# Patient Record
Sex: FEMALE | Race: WHITE
[De-identification: names, ages, dates, MRNs, and addresses within clinical notes are randomized per-mention and may not be internally consistent; named-entity substitution may affect disease eponyms.]

---

## 2019-10-12 ENCOUNTER — TRANSCRIPTION ENCOUNTER (OUTPATIENT)
Age: 49
End: 2019-10-12

## 2019-12-03 ENCOUNTER — TRANSCRIPTION ENCOUNTER (OUTPATIENT)
Age: 49
End: 2019-12-03

## 2020-04-22 ENCOUNTER — ASOB RESULT (OUTPATIENT)
Age: 50
End: 2020-04-22

## 2020-04-22 ENCOUNTER — APPOINTMENT (OUTPATIENT)
Dept: ANTEPARTUM | Facility: CLINIC | Age: 50
End: 2020-04-22
Payer: MEDICAID

## 2020-04-22 PROCEDURE — 76830 TRANSVAGINAL US NON-OB: CPT

## 2020-04-22 PROCEDURE — 76856 US EXAM PELVIC COMPLETE: CPT | Mod: 59

## 2020-10-16 ENCOUNTER — APPOINTMENT (OUTPATIENT)
Dept: DERMATOLOGY | Facility: CLINIC | Age: 50
End: 2020-10-16
Payer: MEDICAID

## 2020-10-16 PROCEDURE — 99203 OFFICE O/P NEW LOW 30 MIN: CPT

## 2020-10-16 NOTE — HISTORY OF PRESENT ILLNESS
[de-identified] : Pt. c/o outbreaks on face;\par has used multiple new products;  wedding in 6 weeks; \par On spironolactone for acne x many years; 100/d

## 2020-10-16 NOTE — ASSESSMENT
[FreeTextEntry1] : Acne;  had been doing well for years on spirono; \par now with mild flareup\par \par Therapeutic options and their risks and benefits; along with multiple diagnostic possibilities were discussed at length;\par \par possibly exacerbated by OTC retinol wipes;  d/c;  \par clindets/5% BPO gel BID; cont spirono; \par use both through wedding; \par f/u if does not improve

## 2020-10-16 NOTE — PHYSICAL EXAM
[FreeTextEntry3] : Skin examination performed of the face, neck, chest, hands, lower legs;\par The patient is well, alert and oriented, pleasant and cooperative.\par Eyelids, conjunctivae, oral mucosa, digits and nails all normal.  \par No cervical adenopathy.\par \par \par few scattered acne lesions on face;  no cysts;

## 2021-02-02 ENCOUNTER — TRANSCRIPTION ENCOUNTER (OUTPATIENT)
Age: 51
End: 2021-02-02

## 2021-03-24 ENCOUNTER — TRANSCRIPTION ENCOUNTER (OUTPATIENT)
Age: 51
End: 2021-03-24

## 2021-04-07 ENCOUNTER — TRANSCRIPTION ENCOUNTER (OUTPATIENT)
Age: 51
End: 2021-04-07

## 2021-04-12 ENCOUNTER — TRANSCRIPTION ENCOUNTER (OUTPATIENT)
Age: 51
End: 2021-04-12

## 2021-05-03 DIAGNOSIS — Z86.59 PERSONAL HISTORY OF OTHER MENTAL AND BEHAVIORAL DISORDERS: ICD-10-CM

## 2021-05-03 DIAGNOSIS — Z86.39 PERSONAL HISTORY OF OTHER ENDOCRINE, NUTRITIONAL AND METABOLIC DISEASE: ICD-10-CM

## 2021-05-13 ENCOUNTER — TRANSCRIPTION ENCOUNTER (OUTPATIENT)
Age: 51
End: 2021-05-13

## 2021-07-26 ENCOUNTER — TRANSCRIPTION ENCOUNTER (OUTPATIENT)
Age: 51
End: 2021-07-26

## 2021-10-18 ENCOUNTER — APPOINTMENT (OUTPATIENT)
Dept: DERMATOLOGY | Facility: CLINIC | Age: 51
End: 2021-10-18
Payer: MEDICAID

## 2021-10-18 DIAGNOSIS — L28.0 LICHEN SIMPLEX CHRONICUS: ICD-10-CM

## 2021-10-18 PROCEDURE — 99214 OFFICE O/P EST MOD 30 MIN: CPT

## 2021-10-18 NOTE — HISTORY OF PRESENT ILLNESS
[de-identified] : Pt. c/o outbreaks on face;\par On spironolactone for acne x many years; 100/d \par \par also: skin check;  c/o lesion on face; itchy; \par

## 2021-10-18 NOTE — ASSESSMENT
[FreeTextEntry1] : Acne;  had been doing well for years on spirono; \par some exacerbation due to testosterone therapy from PMD\par \par Therapeutic options and their risks and benefits; along with multiple diagnostic possibilities were discussed at length;\par \par continue spirono 100/d, restart 5% BPO gel\par \par LSC L upper forehead;  keep covered, avoid rubbing;  may need ILK or topical steroid if persists\par The patient has a history of significant sun exposure.  Continue annual exams. \par

## 2021-12-08 ENCOUNTER — APPOINTMENT (OUTPATIENT)
Dept: ORTHOPEDIC SURGERY | Facility: CLINIC | Age: 51
End: 2021-12-08

## 2021-12-20 ENCOUNTER — TRANSCRIPTION ENCOUNTER (OUTPATIENT)
Age: 51
End: 2021-12-20

## 2021-12-20 ENCOUNTER — APPOINTMENT (OUTPATIENT)
Dept: NEUROSURGERY | Facility: CLINIC | Age: 51
End: 2021-12-20
Payer: MEDICAID

## 2021-12-20 VITALS
BODY MASS INDEX: 26.66 KG/M2 | OXYGEN SATURATION: 100 % | HEART RATE: 72 BPM | SYSTOLIC BLOOD PRESSURE: 103 MMHG | DIASTOLIC BLOOD PRESSURE: 70 MMHG | WEIGHT: 160 LBS | HEIGHT: 65 IN | TEMPERATURE: 97.8 F

## 2021-12-20 PROCEDURE — 99202 OFFICE O/P NEW SF 15 MIN: CPT

## 2021-12-20 NOTE — CONSULT LETTER
[Dear  ___] : Dear  [unfilled], [Courtesy Letter:] : I had the pleasure of seeing your patient, [unfilled], in my office today. [Sincerely,] : Sincerely, [FreeTextEntry2] : Abel Morrell MD \par [address]\par [place] [id number] [FreeTextEntry1] : Nita Dumont is a 51-year-old  who presents with cervical symptoms.  Patient reports symptoms started sometime in the summer.  She is not sure of a particular event however she reports during the summertime her dog had bit her in the medial aspect of her left palm and instantly produced numbness and tingling.  She now reports intermittent numbness and tingling to her entire left arm and hand and fingers.  She reports an intermittent neck ache which radiates into her left shoulder.  She denies any sharp shooting pain.  She denies any arm weakness or dropping of objects from her hands.  She denies any dexterity issues.  She denies any bowel or bladder dysfunction or difficulties with walking.  She denies any chest pain, palpitations, or shortness of breath.\par \par Patient indicates she was seen in urgent care facility July 26, 2021 around the same time was having the paresthesias.  She had a full cardiac work-up done.  Negative findings as per patient.\par \par Patient is alert and oriented.  No distress noted.  Sensation to pinprick and temperature to bilateral upper extremities equal and normal.  Strength to bilateral arms 5/5.  Reflexes to bilateral upper extremities present and equal.  Negative Mat's.  Negative Tinel's.  Negative Phalen's.  Negative Froment's.  Negative Wartenberg's.  Patient is walking without difficulties.\par \par I provided patient with a prescription for an x-ray of the cervical spine.  I provided the patient with a referral for physical therapy.  We will follow-up in approximately 6 weeks to evaluate for progression.  Patient is aware to call with any further questions or concerns or with any new or worsening symptoms.  If symptoms persist or worsen we will consider MRI of the cervical spine as well as an EMG study of the left upper extremity. [FreeTextEntry3] : Sparkle Bingham, MSN, FNP-BC\par Nurse Practitioner \par Neurosurgery \par [address] [place] [address], 2nd floor \[place] [address]\par Office: [phone]\par Fax: [phone]\par

## 2021-12-20 NOTE — CONSULT LETTER
[Dear  ___] : Dear  [unfilled], [Courtesy Letter:] : I had the pleasure of seeing your patient, [unfilled], in my office today. [Sincerely,] : Sincerely, [FreeTextEntry2] : Abel Morrell MD \par [address]\par [place] [id number] [FreeTextEntry1] : Nita Dumont is a 51-year-old  who presents with cervical symptoms.  Patient reports symptoms started sometime in the summer.  She is not sure of a particular event however she reports during the summertime her dog had bit her in the medial aspect of her left palm and instantly produced numbness and tingling.  She now reports intermittent numbness and tingling to her entire left arm and hand and fingers.  She reports an intermittent neck ache which radiates into her left shoulder.  She denies any sharp shooting pain.  She denies any arm weakness or dropping of objects from her hands.  She denies any dexterity issues.  She denies any bowel or bladder dysfunction or difficulties with walking.  She denies any chest pain, palpitations, or shortness of breath.\par \par Patient indicates she was seen in urgent care facility July 26, 2021 around the same time was having the paresthesias.  She had a full cardiac work-up done.  Negative findings as per patient.\par \par Patient is alert and oriented.  No distress noted.  Sensation to pinprick and temperature to bilateral upper extremities equal and normal.  Strength to bilateral arms 5/5.  Reflexes to bilateral upper extremities present and equal.  Negative Mat's.  Negative Tinel's.  Negative Phalen's.  Negative Froment's.  Negative Wartenberg's.  Patient is walking without difficulties.\par \par I provided patient with a prescription for an x-ray of the cervical spine.  I provided the patient with a referral for physical therapy.  We will follow-up in approximately 6 weeks to evaluate for progression.  Patient is aware to call with any further questions or concerns or with any new or worsening symptoms.  If symptoms persist or worsen we will consider MRI of the cervical spine as well as an EMG study of the left upper extremity. [FreeTextEntry3] : Sparkle Bingham, MSN, FNP-BC\par Nurse Practitioner \par Neurosurgery \par [address] [place] [address], 2nd floor \[place] [address]\par Office: [phone]\par Fax: [phone]\par

## 2022-01-31 ENCOUNTER — APPOINTMENT (OUTPATIENT)
Dept: NEUROSURGERY | Facility: CLINIC | Age: 52
End: 2022-01-31
Payer: MEDICAID

## 2022-01-31 VITALS
WEIGHT: 169 LBS | DIASTOLIC BLOOD PRESSURE: 66 MMHG | HEART RATE: 77 BPM | SYSTOLIC BLOOD PRESSURE: 97 MMHG | HEIGHT: 65 IN | OXYGEN SATURATION: 98 % | TEMPERATURE: 97.6 F | BODY MASS INDEX: 28.16 KG/M2

## 2022-01-31 PROCEDURE — 99214 OFFICE O/P EST MOD 30 MIN: CPT

## 2022-01-31 NOTE — CONSULT LETTER
[Dear  ___] : Dear  [unfilled], [Courtesy Letter:] : I had the pleasure of seeing your patient, [unfilled], in my office today. [Sincerely,] : Sincerely, [FreeTextEntry2] : Abel Morrell MD \par [address]\par [place] [id number]  [FreeTextEntry1] : Zheng Dumont is a 52-year-old female who returns today for cervical symptoms.  Patient has undergone 6 weeks of physical therapy without any relief.  She continues to experience stiffness in her cervical and shoulder region.  She continues to experience tingling to her left posterior arm and into her hand involving all fingers.  She denies any upper extremity weakness, dropping of objects from her hands, difficulties with walking or tripping over her feet.  She denies any dexterity issues.  Patient is right-handed.  She denies bowel or bladder dysfunction.  Patient indicates shaking her right hand provides her with some relief.  Once again she started physical therapy on December 21, 2021  without any relief noted.  Patient underwent x-ray of the cervical spine performed on 12/22/2021 at [facility] indicating "no dynamic instability, degenerative changes, straightening of the normal cervical lordosis."  Patient is alert and oriented. No distress noted. Strength to bilateral arms 5/5. Reflexes to bilateral upper extremities present and equal. Negative Mat's.  Negative clonus.  Positive right Tinel's.  Not appreciated on the left side.Negative Phalen's. Negative Froment's. Negative Wartenberg's.  I provided patient with a referral for massage pain.  I provided the patient with a prescription for an MRI of the cervical spine and EMG study of the left upper extremity.  Patient will phone office once imaging is available at which time we will discuss the next steps in the treatment process.  Patient aware to call with any further questions or concerns or with any new or worsening symptoms.  [FreeTextEntry3] : Sparkle Bingham, MSN, FNP-BC\par Department of Neurosurgery \par [facility]\par [address] [place] [address], 2nd floor\[place] [address]\par Office: [phone]\par Fax: [phone]\par

## 2022-02-10 ENCOUNTER — TRANSCRIPTION ENCOUNTER (OUTPATIENT)
Age: 52
End: 2022-02-10

## 2022-03-17 ENCOUNTER — APPOINTMENT (OUTPATIENT)
Dept: NEUROSURGERY | Facility: CLINIC | Age: 52
End: 2022-03-17
Payer: MEDICAID

## 2022-03-17 VITALS
DIASTOLIC BLOOD PRESSURE: 69 MMHG | HEART RATE: 65 BPM | SYSTOLIC BLOOD PRESSURE: 115 MMHG | WEIGHT: 169 LBS | OXYGEN SATURATION: 98 % | BODY MASS INDEX: 28.16 KG/M2 | HEIGHT: 65 IN | TEMPERATURE: 97.3 F

## 2022-03-17 PROCEDURE — 99214 OFFICE O/P EST MOD 30 MIN: CPT

## 2022-03-17 NOTE — CONSULT LETTER
[Dear  ___] : Dear  [unfilled], [Courtesy Letter:] : I had the pleasure of seeing your patient, [unfilled], in my office today. [Sincerely,] : Sincerely, [FreeTextEntry2] : Abel Morrell MD \par [address]\[place] [address] \par  [FreeTextEntry1] : Mrs. Dumont is a very pleasant 51-year-old female patient who was seen in our office today in follow-up.  The patient was seen in regards to neck heaviness and left-sided upper extremity symptoms.\par \par The patient endorses a several month history of numbness  radiating down the left upper extremity as well as a sensation of heaviness in the neck overall.  The patient has attempted physical therapy without significant benefit.  At physical therapy, the patient participated in both active and passive exercises and the patient attempted physical therapy 3 times a week for over 3 months at this time, the patient states that her symptoms have not changed significantly.  The patient continues to experience intermittent numbness in the left arm radiating from the neck into the first 2 digits of her hand primarily.  Occasionally, the patient also experiences numbness on the volar aspect of the last 2 digits of the left  hand.  The patient denies any symptoms occurring on the right.  The patient denies any persistent weakness in the upper extremity.  However, the patient did experience an episode of weakness in the  left hand after falling asleep on it.  The patient states that her hand function  has since returned to normal and has not happened again.  The patient does endorse worsening of her left-sided upper extremity symptoms with extension of the neck as well as rotation to the right.\par \par On examination, the patient is alert, oriented, and compliant with the exam.  The patient demonstrates 5/5 strength in the upper extremities bilaterally.  Percussion of the cubital and carpal tunnel does not elicit any neurologic symptoms.  Rotation of the head to the right and extension of the neck can reproduce hand and arm numbness.\par \par The patient is accompanied with an MRI scan of the cervical spine dated February 28, 2022.  These images demonstrate loss of cervical lordosis, though these images were taken supine.  The patient additionally has a very small disc bulge at C6/7 slightly eccentric to the left.  There is no ongoing nerve root compression or spinal cord compression.  At C5/6, there is evidence of degenerative disc disease with evidence of Modic changes.  Again, there is no evidence of spinal cord or nerve root compression.\par \par Taken together, the patient has a slightly atypical presentation for a cervical radiculopathy.  There are no obvious surgical lesions in the cervical spine that would correspond to the patient's intermittent symptoms though there is a consistent relationship with movements of the neck.  The patient's distribution of numbness is more consistent with a C6 and/or C8 radiculopathy.  However, the possibility of an ulnar nerve irritation or brachial plexus issue remains on the differential. The possibility of a thoracic outlet syndrome also remains on the differential.  At this time, the patient is awaiting nerve conduction studies and EMG studies to help diagnose this condition.  Ultimately, the patient states that the numbness in her hand and arm is irritating but not something she would consider surgical intervention for.  I have reassured the patient that there is no urgent surgical lesion in her cervical spine and that she may continue activities as tolerated in this regard.  The patient has been recommended follow-up once her electrophysiologic studies are complete so that we can review them with her. [FreeTextEntry3] : Kenneth Jensen MD, PhD, FRCPSC \par Attending Neurosurgeon \par [facility] \par [address], 2nd floor \[place] [address] \par Office: [phone] \par Fax: [phone]\par \par

## 2022-03-21 ENCOUNTER — APPOINTMENT (OUTPATIENT)
Dept: INTERNAL MEDICINE | Facility: CLINIC | Age: 52
End: 2022-03-21
Payer: MEDICAID

## 2022-03-21 VITALS
TEMPERATURE: 98.3 F | WEIGHT: 169 LBS | DIASTOLIC BLOOD PRESSURE: 60 MMHG | OXYGEN SATURATION: 97 % | SYSTOLIC BLOOD PRESSURE: 110 MMHG | HEIGHT: 65 IN | RESPIRATION RATE: 16 BRPM | BODY MASS INDEX: 28.16 KG/M2 | HEART RATE: 61 BPM

## 2022-03-21 DIAGNOSIS — F32.A DEPRESSION, UNSPECIFIED: ICD-10-CM

## 2022-03-21 DIAGNOSIS — M47.812 SPONDYLOSIS W/OUT MYELOPATHY OR RADICULOPATHY, CERVICAL REGION: ICD-10-CM

## 2022-03-21 PROCEDURE — 99386 PREV VISIT NEW AGE 40-64: CPT

## 2022-03-21 NOTE — HEALTH RISK ASSESSMENT
[Never] : Never [No] : In the past 12 months have you used drugs other than those required for medical reasons? No [No falls in past year] : Patient reported no falls in the past year [0] : 2) Feeling down, depressed, or hopeless: Not at all (0) [[id number]] : 0

## 2022-03-21 NOTE — ASSESSMENT
[FreeTextEntry1] : Ms. Dumont presents for initial annual physical examination.\par \par #1. Patient will discontinue estrogen and progesterone medications. She will also discontinue metformin. She will continue on Wellbutrin.\par \par #2. Prescription for CBC, CMP with hemoglobin A1c, iron level, lipid profile, TSH level and urinalysis at the patient.\par \par #3. She will establish medical contact with the new gynecologist for mammogram, Pap smear and bone density testing.\par \par #4. Patient will be referred to Dr. Bernstein for screening colonoscopy.

## 2022-03-21 NOTE — HISTORY OF PRESENT ILLNESS
[FreeTextEntry1] : Initial annual physical examination [de-identified] : Ms. Dumont presents for initial annual physical examination. She is a 51-year-old female with a history of hyperlipidemia, depression and cervical and lumbar disc disease. Patient is moving from [facility] and wishes to reestablish care in this office. She denies any chest pain, shortness of breath or palpitations. Ms. Dumont has no hematuria or dysuria. She was started on metformin by her previous primary care doctor 4 obesity. The patient does take multiple vitamins and supplements. She has no nocturnal symptoms of cough or shortness of breath. She has been on Wellbutrin for many years with positive results.

## 2022-03-23 ENCOUNTER — TRANSCRIPTION ENCOUNTER (OUTPATIENT)
Age: 52
End: 2022-03-23

## 2022-03-23 ENCOUNTER — NON-APPOINTMENT (OUTPATIENT)
Age: 52
End: 2022-03-23

## 2022-04-07 RX ORDER — BUPROPION HYDROCHLORIDE 75 MG/1
TABLET, FILM COATED ORAL
Refills: 0 | Status: DISCONTINUED | COMMUNITY
End: 2022-04-07

## 2022-04-23 ENCOUNTER — TRANSCRIPTION ENCOUNTER (OUTPATIENT)
Age: 52
End: 2022-04-23

## 2022-05-02 ENCOUNTER — NON-APPOINTMENT (OUTPATIENT)
Age: 52
End: 2022-05-02

## 2022-05-02 ENCOUNTER — APPOINTMENT (OUTPATIENT)
Dept: ORTHOPEDIC SURGERY | Facility: CLINIC | Age: 52
End: 2022-05-02
Payer: MEDICAID

## 2022-05-02 VITALS — WEIGHT: 169 LBS | BODY MASS INDEX: 28.16 KG/M2 | HEIGHT: 65 IN

## 2022-05-02 PROCEDURE — 99214 OFFICE O/P EST MOD 30 MIN: CPT

## 2022-05-02 PROCEDURE — 72100 X-RAY EXAM L-S SPINE 2/3 VWS: CPT

## 2022-05-13 ENCOUNTER — APPOINTMENT (OUTPATIENT)
Dept: ULTRASOUND IMAGING | Facility: CLINIC | Age: 52
End: 2022-05-13
Payer: MEDICAID

## 2022-05-13 ENCOUNTER — OUTPATIENT (OUTPATIENT)
Dept: OUTPATIENT SERVICES | Facility: HOSPITAL | Age: 52
LOS: 1 days | End: 2022-05-13
Payer: MEDICAID

## 2022-05-13 DIAGNOSIS — E78.00 PURE HYPERCHOLESTEROLEMIA, UNSPECIFIED: ICD-10-CM

## 2022-05-13 PROCEDURE — 93880 EXTRACRANIAL BILAT STUDY: CPT | Mod: 26

## 2022-05-13 PROCEDURE — 93880 EXTRACRANIAL BILAT STUDY: CPT

## 2022-05-15 DIAGNOSIS — I77.89 OTHER SPECIFIED DISORDERS OF ARTERIES AND ARTERIOLES: ICD-10-CM

## 2022-05-18 ENCOUNTER — NON-APPOINTMENT (OUTPATIENT)
Age: 52
End: 2022-05-18

## 2022-05-26 ENCOUNTER — APPOINTMENT (OUTPATIENT)
Dept: NEUROLOGY | Facility: CLINIC | Age: 52
End: 2022-05-26
Payer: MEDICAID

## 2022-05-26 PROCEDURE — 95909 NRV CNDJ TST 5-6 STUDIES: CPT

## 2022-05-26 PROCEDURE — 95886 MUSC TEST DONE W/N TEST COMP: CPT

## 2022-08-18 LAB
ALBUMIN SERPL ELPH-MCNC: 4 G/DL
ALP BLD-CCNC: 49 U/L
ALT SERPL-CCNC: 10 U/L
AST SERPL-CCNC: 19 U/L
BASOPHILS # BLD AUTO: 0.07 K/UL
BASOPHILS NFR BLD AUTO: 0.9 %
BILIRUB DIRECT SERPL-MCNC: 0.1 MG/DL
BILIRUB INDIRECT SERPL-MCNC: 0.2 MG/DL
BILIRUB SERPL-MCNC: 0.3 MG/DL
CHOLEST SERPL-MCNC: 247 MG/DL
EOSINOPHIL # BLD AUTO: 0.09 K/UL
EOSINOPHIL NFR BLD AUTO: 1.2 %
FERRITIN SERPL-MCNC: 77 NG/ML
FOLATE SERPL-MCNC: 9 NG/ML
HCT VFR BLD CALC: 38.2 %
HDLC SERPL-MCNC: 63 MG/DL
HGB BLD-MCNC: 13 G/DL
IMM GRANULOCYTES NFR BLD AUTO: 0.3 %
IRON SATN MFR SERPL: 37 %
IRON SERPL-MCNC: 117 UG/DL
LDLC SERPL CALC-MCNC: 156 MG/DL
LYMPHOCYTES # BLD AUTO: 2.47 K/UL
LYMPHOCYTES NFR BLD AUTO: 31.8 %
MAN DIFF?: NORMAL
MCHC RBC-ENTMCNC: 31.8 PG
MCHC RBC-ENTMCNC: 34 GM/DL
MCV RBC AUTO: 93.4 FL
MONOCYTES # BLD AUTO: 0.49 K/UL
MONOCYTES NFR BLD AUTO: 6.3 %
NEUTROPHILS # BLD AUTO: 4.62 K/UL
NEUTROPHILS NFR BLD AUTO: 59.5 %
NONHDLC SERPL-MCNC: 184 MG/DL
PLATELET # BLD AUTO: 281 K/UL
PROT SERPL-MCNC: 6.6 G/DL
RBC # BLD: 4.09 M/UL
RBC # BLD: 4.09 M/UL
RBC # FLD: 12.3 %
RETICS # AUTO: 1.5 %
RETICS AGGREG/RBC NFR: 61.4 K/UL
TIBC SERPL-MCNC: 314 UG/DL
TRANSFERRIN SERPL-MCNC: 241 MG/DL
TRIGL SERPL-MCNC: 137 MG/DL
UIBC SERPL-MCNC: 197 UG/DL
VIT B12 SERPL-MCNC: 790 PG/ML
WBC # FLD AUTO: 7.76 K/UL

## 2022-09-14 ENCOUNTER — RX RENEWAL (OUTPATIENT)
Age: 52
End: 2022-09-14

## 2022-11-21 ENCOUNTER — APPOINTMENT (OUTPATIENT)
Dept: DERMATOLOGY | Facility: CLINIC | Age: 52
End: 2022-11-21

## 2022-11-21 DIAGNOSIS — L70.0 ACNE VULGARIS: ICD-10-CM

## 2022-11-21 PROCEDURE — 99214 OFFICE O/P EST MOD 30 MIN: CPT

## 2022-11-21 NOTE — HISTORY OF PRESENT ILLNESS
[de-identified] : Pt. c/o outbreaks on face;\par On spironolactone for acne x many years; 100/d \par \par also: skin check;  c/o itchy spot on L hand after wasp sting\par

## 2022-11-21 NOTE — ASSESSMENT
[FreeTextEntry1] : Acne;  had been doing well for years on spirono; \par some exacerbation due to testosterone therapy from PMD\par \par Therapeutic options and their risks and benefits; along with multiple diagnostic possibilities were discussed at length;\par \par continue spirono 100/d, \par \par The patient has a history of significant sun exposure.  Continue annual exams. \par

## 2022-11-21 NOTE — PHYSICAL EXAM
[FreeTextEntry3] : Skin examination performed of the face, neck, chest, hands, lower legs;\par The patient is well, alert and oriented, pleasant and cooperative.\par Eyelids, conjunctivae, oral mucosa, digits and nails all normal.  \par No cervical adenopathy.\par \par few scattered acne lesions on face;  \par \par + lentigines and solar damage are present in sun exposed areas; \par Multiple benign nevi were noted. \par \par Firm, pink, subcutaneous buttonholing papule Left palm; \par \par No lesions suspicious for malignancy.

## 2023-02-03 ENCOUNTER — RX RENEWAL (OUTPATIENT)
Age: 53
End: 2023-02-03

## 2023-02-27 ENCOUNTER — RX RENEWAL (OUTPATIENT)
Age: 53
End: 2023-02-27

## 2023-03-22 ENCOUNTER — APPOINTMENT (OUTPATIENT)
Dept: INTERNAL MEDICINE | Facility: CLINIC | Age: 53
End: 2023-03-22
Payer: MEDICAID

## 2023-03-22 ENCOUNTER — LABORATORY RESULT (OUTPATIENT)
Age: 53
End: 2023-03-22

## 2023-03-22 VITALS
WEIGHT: 163 LBS | HEART RATE: 74 BPM | DIASTOLIC BLOOD PRESSURE: 70 MMHG | OXYGEN SATURATION: 98 % | SYSTOLIC BLOOD PRESSURE: 104 MMHG | HEIGHT: 65 IN | BODY MASS INDEX: 27.16 KG/M2 | RESPIRATION RATE: 16 BRPM | TEMPERATURE: 97.8 F

## 2023-03-22 DIAGNOSIS — Z00.00 ENCOUNTER FOR GENERAL ADULT MEDICAL EXAMINATION W/OUT ABNORMAL FINDINGS: ICD-10-CM

## 2023-03-22 PROCEDURE — 99396 PREV VISIT EST AGE 40-64: CPT

## 2023-03-22 RX ORDER — IBUPROFEN 800 MG/1
800 TABLET, FILM COATED ORAL 3 TIMES DAILY
Qty: 90 | Refills: 1 | Status: DISCONTINUED | COMMUNITY
Start: 2022-05-02 | End: 2023-03-22

## 2023-03-22 RX ORDER — METHOCARBAMOL 750 MG/1
750 TABLET, FILM COATED ORAL 3 TIMES DAILY
Qty: 90 | Refills: 1 | Status: DISCONTINUED | COMMUNITY
Start: 2022-05-02 | End: 2023-03-22

## 2023-03-22 RX ORDER — CLINDAMYCIN PHOSPHATE 10 MG/ML
1 SOLUTION TOPICAL
Qty: 60 | Refills: 5 | Status: DISCONTINUED | COMMUNITY
Start: 2020-10-16 | End: 2023-03-22

## 2023-03-22 RX ORDER — NORGESTIMATE AND ETHINYL ESTRADIOL 0.25-0.035
KIT ORAL
Refills: 0 | Status: DISCONTINUED | COMMUNITY
End: 2023-03-22

## 2023-03-22 RX ORDER — BENZOYL PEROXIDE 5 G/100G
5 GEL TOPICAL
Qty: 1 | Refills: 3 | Status: DISCONTINUED | COMMUNITY
Start: 2020-10-16 | End: 2023-03-22

## 2023-03-22 NOTE — HEALTH RISK ASSESSMENT
[Yes] : Yes [Never (0 pts)] : Never (0 points) [No] : In the past 12 months have you used drugs other than those required for medical reasons? No [0] : 1) Little interest or pleasure doing things: Not at all (0) [1] : 2) Feeling down, depressed, or hopeless for several days (1) [Never] : Never [Very Good] : ~his/her~  mood as very good [MammogramDate] : 01/22 [PapSmearDate] : 01/22

## 2023-03-22 NOTE — HISTORY OF PRESENT ILLNESS
[FreeTextEntry1] : Mrs. Dumont presents for annual physical examination.\par She is complaining of constipation, weight gain and coldness of extremities.  She does have a history of Raynaud's phenomenon. [de-identified] : Mrs. Dumont presents for annual physical examination.\par She is concerned about the fact that she has been unable to lose weight in the past year.  She states that she has been dieting on a regular basis and has eliminated carbohydrates.  She has not really been exercising.  Ms. Dumont states that she has had significant difficulty in losing weight.  She is currently 163 pounds with a BMI of 27.12.  She did weigh 169 pounds in May 2022.\par Ms. Dumont has a history of Raynaud's phenomenon.  She often gets significant coldness of her toes and fingers in the cold weather.  They will occasionally become pale.\par Patient is also complaining of constipation.  She states that since she was on a cruise in 2019 she has had persistent constipation.  She does use laxatives which give positive results, however, she does not want to depend on laxatives for bowel movements.  She has tried change in diet with no significant improvement.  Patient has not seen a gastroenterologist.

## 2023-03-22 NOTE — PLAN
[FreeTextEntry1] : Ms. Dumont presents for annual physical examination.\par \par 1.  She has been complaining of inability to lose weight.  I have recommended that she see nutritionist.  She will check with her insurance company to see if nutritional services are provided.  If not she will contact this office for referral.\par \par 2.  Ms. Dumont has been complaining of constipation.  It does respond to laxatives however she does not want to depend on laxatives for bowel movements.  She is also never had a colonoscopy.  Patient has been referred to Dr. Power for evaluation of her constipation as well as screening colonoscopy.\par \par 3.  Patient will continue to follow-up with her gynecologist regarding the mammograms, Pap smears.\par \par 4.  Patient continues to have symptoms of Raynaud's phenomenon.  She has been given a prescription for CBC, CMP, hemoglobin A1c, thyroid function, Iron levels, lipid profile and urinalysis.  Collagen vascular screening has also been included.  She will have an IVIS, rheumatoid factor, sed rate and CRP level.  Patient may require rheumatology evaluation.\par \par 5.  Follow-up in 6 months

## 2023-03-23 LAB
ALBUMIN SERPL ELPH-MCNC: 4.2 G/DL
ALP BLD-CCNC: 42 U/L
ALT SERPL-CCNC: 15 U/L
ANA PAT FLD IF-IMP: ABNORMAL
ANA SER IF-ACNC: ABNORMAL
ANION GAP SERPL CALC-SCNC: 14 MMOL/L
APPEARANCE: CLEAR
AST SERPL-CCNC: 18 U/L
BACTERIA: NEGATIVE
BASOPHILS # BLD AUTO: 0.04 K/UL
BASOPHILS NFR BLD AUTO: 0.6 %
BILIRUB SERPL-MCNC: 0.5 MG/DL
BILIRUBIN URINE: NEGATIVE
BLOOD URINE: NEGATIVE
BUN SERPL-MCNC: 11 MG/DL
CALCIUM SERPL-MCNC: 9.7 MG/DL
CHLORIDE SERPL-SCNC: 97 MMOL/L
CHOLEST SERPL-MCNC: 273 MG/DL
CO2 SERPL-SCNC: 23 MMOL/L
COLOR: NORMAL
CREAT SERPL-MCNC: 0.83 MG/DL
CRP SERPL-MCNC: 5 MG/L
EGFR: 84 ML/MIN/1.73M2
EOSINOPHIL # BLD AUTO: 0.03 K/UL
EOSINOPHIL NFR BLD AUTO: 0.4 %
ERYTHROCYTE [SEDIMENTATION RATE] IN BLOOD BY WESTERGREN METHOD: 29 MM/HR
ESTIMATED AVERAGE GLUCOSE: 100 MG/DL
FT4I SERPL CALC-MCNC: 9.2 INDEX
GLUCOSE QUALITATIVE U: NEGATIVE
GLUCOSE SERPL-MCNC: 77 MG/DL
HBA1C MFR BLD HPLC: 5.1 %
HCT VFR BLD CALC: 41.6 %
HDLC SERPL-MCNC: 66 MG/DL
HGB BLD-MCNC: 13.7 G/DL
HYALINE CASTS: 0 /LPF
IMM GRANULOCYTES NFR BLD AUTO: 0.1 %
IRON SATN MFR SERPL: 66 %
IRON SERPL-MCNC: 205 UG/DL
KETONES URINE: ABNORMAL
LDLC SERPL CALC-MCNC: 186 MG/DL
LEUKOCYTE ESTERASE URINE: NEGATIVE
LYMPHOCYTES # BLD AUTO: 1.72 K/UL
LYMPHOCYTES NFR BLD AUTO: 23.8 %
MAN DIFF?: NORMAL
MCHC RBC-ENTMCNC: 32.2 PG
MCHC RBC-ENTMCNC: 32.9 GM/DL
MCV RBC AUTO: 97.7 FL
MICROSCOPIC-UA: NORMAL
MONOCYTES # BLD AUTO: 0.53 K/UL
MONOCYTES NFR BLD AUTO: 7.3 %
NEUTROPHILS # BLD AUTO: 4.91 K/UL
NEUTROPHILS NFR BLD AUTO: 67.8 %
NITRITE URINE: NEGATIVE
NONHDLC SERPL-MCNC: 206 MG/DL
PH URINE: 6.5
PLATELET # BLD AUTO: 308 K/UL
POTASSIUM SERPL-SCNC: 4.6 MMOL/L
PROT SERPL-MCNC: 6.9 G/DL
PROTEIN URINE: NEGATIVE
RBC # BLD: 4.26 M/UL
RBC # FLD: 12 %
RED BLOOD CELLS URINE: 0 /HPF
RHEUMATOID FACT SER QL: 37 IU/ML
SODIUM SERPL-SCNC: 134 MMOL/L
SPECIFIC GRAVITY URINE: 1.02
SQUAMOUS EPITHELIAL CELLS: 6 /HPF
T3 SERPL-MCNC: 137 NG/DL
T3FREE SERPL-MCNC: 2.54 PG/ML
T3RU NFR SERPL: 1.3 TBI
T4 FREE SERPL-MCNC: 1.4 NG/DL
T4 SERPL-MCNC: 11.5 UG/DL
TIBC SERPL-MCNC: 312 UG/DL
TRIGL SERPL-MCNC: 104 MG/DL
TSH SERPL-ACNC: 1.51 UIU/ML
UIBC SERPL-MCNC: 107 UG/DL
UROBILINOGEN URINE: NORMAL
WBC # FLD AUTO: 7.24 K/UL
WHITE BLOOD CELLS URINE: 1 /HPF

## 2023-03-24 ENCOUNTER — TRANSCRIPTION ENCOUNTER (OUTPATIENT)
Age: 53
End: 2023-03-24

## 2023-03-27 ENCOUNTER — NON-APPOINTMENT (OUTPATIENT)
Age: 53
End: 2023-03-27

## 2023-03-27 ENCOUNTER — APPOINTMENT (OUTPATIENT)
Dept: RHEUMATOLOGY | Facility: CLINIC | Age: 53
End: 2023-03-27
Payer: MEDICAID

## 2023-03-27 ENCOUNTER — TRANSCRIPTION ENCOUNTER (OUTPATIENT)
Age: 53
End: 2023-03-27

## 2023-03-27 VITALS
HEART RATE: 70 BPM | BODY MASS INDEX: 27.16 KG/M2 | HEIGHT: 65 IN | TEMPERATURE: 97.6 F | SYSTOLIC BLOOD PRESSURE: 100 MMHG | OXYGEN SATURATION: 98 % | DIASTOLIC BLOOD PRESSURE: 68 MMHG | WEIGHT: 163 LBS

## 2023-03-27 DIAGNOSIS — M19.042 PRIMARY OSTEOARTHRITIS, RIGHT HAND: ICD-10-CM

## 2023-03-27 DIAGNOSIS — R76.8 OTHER SPECIFIED ABNORMAL IMMUNOLOGICAL FINDINGS IN SERUM: ICD-10-CM

## 2023-03-27 DIAGNOSIS — F32.A ANXIETY DISORDER, UNSPECIFIED: ICD-10-CM

## 2023-03-27 DIAGNOSIS — F41.9 ANXIETY DISORDER, UNSPECIFIED: ICD-10-CM

## 2023-03-27 DIAGNOSIS — M54.2 CERVICALGIA: ICD-10-CM

## 2023-03-27 DIAGNOSIS — R14.0 ABDOMINAL DISTENSION (GASEOUS): ICD-10-CM

## 2023-03-27 DIAGNOSIS — M25.50 PAIN IN UNSPECIFIED JOINT: ICD-10-CM

## 2023-03-27 DIAGNOSIS — Z81.8 FAMILY HISTORY OF OTHER MENTAL AND BEHAVIORAL DISORDERS: ICD-10-CM

## 2023-03-27 DIAGNOSIS — M19.041 PRIMARY OSTEOARTHRITIS, RIGHT HAND: ICD-10-CM

## 2023-03-27 PROCEDURE — 99205 OFFICE O/P NEW HI 60 MIN: CPT

## 2023-03-27 NOTE — ASSESSMENT
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 \par -No synovitis or effusion on exam noted today and advised to monitor.\par -reviewed labs 3/22/23 +RF=37; raised ESR=29; high CRP=5; +IVIS >1:2560 Speckled; CBC ok; CMP w/ minimally low Iv=744 (to monitor w/ PCP please) \par -labs as below incld ESR, CRP, serologies, Vectra to evaluate disease activity better \par -Referred for xray of b/l hands to evaluate for structural changes, r/o periarticular osteopenia/RA, r/o erosions\par -Referred for xray of b/l feet to evaluate for structural changes, r/o erosions\par \par +IVIS: +IVIS >1:2560 Speckled \par -Clinically reports of sicca symptoms and raynaud's that can be seen w/ Speckled pattern for Sjogren without much symptoms of lupus at this time and educated on symptoms to monitor for in detail if she evolves.\par -dry eyes: told can use artificial tears and check w/ Optho if noted; check Sjogren abs \par -dry mouth: discussed biotene gum prescribed; biotene mouthwash or toothpaste PRN\par -Raynaud's: no ulcers and reports of color changes to white in the hands & feet w/ the cold. Discussed to keep hands warm and if not controlled can add Ca channel blocker if BP allows\par -lab as below w/ disease activity markers for lupus as below to be complete\par -will check thyroid abs as discussed +IVIS can be seen with cross reactivity\par \par Cervicalgia: tenderness in c-spine w/ rotation w/ good ROM w/o paresthesias \par -Referred for MRI c-spine with & without contrast to r/o RA pannus with +RF and neck pain reported \par -reviewed MRI c-spine [facility] 2/28/22 DDD\par -reviewed xray c-spine 12/22/21 DDD, straightening of normal cervical lordosis  \par \par LBP: intermittent \par -Referred for xray of L spine & SI to evaluate for structural changes, DDD, confirm SI normal \par -reviewed MRI L-spine 1/30/13 w/ DDD, disc herniation \par -states sh had spinal fusion surgery done in past \par -Advil PRN w/ food sparingly if needed \par \par Rt>Lt hip/groin pain: -Referred for xray of b/l hips/ pelvis to evaluate for structural changes, OA\par -discussed likely has Rt trochanteric bursitis component also and can consider steroid injection and will think about it for follow up\par \par Abdominal bloating: \par -check celiac panel and reports of celiac disease in sister\par \par Fibromyalgia: + tender points present \par -will plan for gabapentin pending labs\par -encouraged gentle exercises as tolerated\par \par  Depression/Anxiety: denies any SI or HI.\par -on bupropion\par \par -educated on symptoms to monitor for in detail and alert us if any concerns.\par -knows to stay up to date on health maintenance w/ PCP\par -f/u in 10-14 days w/ labs, xrays, MRI c-spine please\par \par \par

## 2023-03-27 NOTE — HISTORY OF PRESENT ILLNESS
[FreeTextEntry1] : 52-year-old female here for the first time reports history of depression/anxiety with + IVIS and + rheumatoid factor with PCP.\par Patient states she has family history of depression as well as celiac disease in her sister.\par Patient states she can notice intermittent stiffness in her hands and will monitor how long.  \par States she notices intermittent soreness in the hands especially around the right more than the left thumbs.\par She will monitor for any swelling.\par States she notices intermittent neck pain with rotation without paresthesias at this time.\par States she notices intermittent lower back pain worse with laying; better with stretching.  Denies any loss of bowel incontinence or saddle anesthesias. States she has had spinal fusion done in the past.\par States she notices right more than left hip groin pain & on the side of the hip at times.\par States she notices intermittent right more than left knee pain with going up and down the stairs.  Denies any crystal arthropathy like attacks thus far.\par States she can notice some intermittent pain in the right more than the left toes at times.\par Denies any fever/chills, no rashes, no ulcers, +dry eyes, + dry mouth, + raynaud's symptoms in the hands & feet turning white in the cold, no infectious diarrhea or  symptoms at this time.\par States she can notice intermittent abdominal bloating and would like to check her celiac panel to see her labs for it and knows gold standard of diagnosis of celiac is with GI.\par Denies any history of blood clot, no stroke, 1 , no miscarriages.\par States she can notice generalized aches and pains all over at times.\par States her depression is controlled on the antidepressant and denies any suicidal ideation or homicidal ideation at this time.\par \par States she is not postmenopausal yet so we will defer DEXA at this time.\par \par \par

## 2023-03-31 ENCOUNTER — LABORATORY RESULT (OUTPATIENT)
Age: 53
End: 2023-03-31

## 2023-04-06 ENCOUNTER — APPOINTMENT (OUTPATIENT)
Dept: MRI IMAGING | Facility: CLINIC | Age: 53
End: 2023-04-06
Payer: MEDICAID

## 2023-04-06 ENCOUNTER — APPOINTMENT (OUTPATIENT)
Dept: RADIOLOGY | Facility: CLINIC | Age: 53
End: 2023-04-06
Payer: MEDICAID

## 2023-04-06 ENCOUNTER — OUTPATIENT (OUTPATIENT)
Dept: OUTPATIENT SERVICES | Facility: HOSPITAL | Age: 53
LOS: 1 days | End: 2023-04-06
Payer: MEDICAID

## 2023-04-06 DIAGNOSIS — M79.674 PAIN IN RIGHT TOE(S): ICD-10-CM

## 2023-04-06 DIAGNOSIS — M25.559 PAIN IN UNSPECIFIED HIP: ICD-10-CM

## 2023-04-06 PROCEDURE — 72156 MRI NECK SPINE W/O & W/DYE: CPT | Mod: 26

## 2023-04-06 PROCEDURE — 73660 X-RAY EXAM OF TOE(S): CPT | Mod: 26,LT,RT

## 2023-04-06 PROCEDURE — 72156 MRI NECK SPINE W/O & W/DYE: CPT

## 2023-04-06 PROCEDURE — 73120 X-RAY EXAM OF HAND: CPT

## 2023-04-06 PROCEDURE — 73120 X-RAY EXAM OF HAND: CPT | Mod: 26,LT,RT

## 2023-04-06 PROCEDURE — 72100 X-RAY EXAM L-S SPINE 2/3 VWS: CPT | Mod: 26

## 2023-04-06 PROCEDURE — 72200 X-RAY EXAM SI JOINTS: CPT | Mod: 26

## 2023-04-06 PROCEDURE — 73521 X-RAY EXAM HIPS BI 2 VIEWS: CPT | Mod: 26

## 2023-04-06 PROCEDURE — 72100 X-RAY EXAM L-S SPINE 2/3 VWS: CPT

## 2023-04-06 PROCEDURE — 73660 X-RAY EXAM OF TOE(S): CPT

## 2023-04-06 PROCEDURE — 72200 X-RAY EXAM SI JOINTS: CPT

## 2023-04-06 PROCEDURE — 73521 X-RAY EXAM HIPS BI 2 VIEWS: CPT

## 2023-04-06 PROCEDURE — A9585: CPT

## 2023-04-10 ENCOUNTER — APPOINTMENT (OUTPATIENT)
Dept: RHEUMATOLOGY | Facility: CLINIC | Age: 53
End: 2023-04-10
Payer: MEDICAID

## 2023-04-10 VITALS
DIASTOLIC BLOOD PRESSURE: 62 MMHG | TEMPERATURE: 97.6 F | HEART RATE: 78 BPM | OXYGEN SATURATION: 98 % | WEIGHT: 167 LBS | BODY MASS INDEX: 27.79 KG/M2 | SYSTOLIC BLOOD PRESSURE: 98 MMHG

## 2023-04-10 DIAGNOSIS — M19.049 PRIMARY OSTEOARTHRITIS, UNSPECIFIED HAND: ICD-10-CM

## 2023-04-10 LAB
AA PROT SER-MCNC: 2.1 UG/ML
ACE BLD-CCNC: 59 U/L
ANA PAT FLD IF-IMP: ABNORMAL
ANA SER IF-ACNC: ABNORMAL
B19V IGG SER QL IA: 7.29 INDEX
B19V IGG+IGM SER-IMP: NORMAL
B19V IGG+IGM SER-IMP: POSITIVE
B19V IGM FLD-ACNC: 0.12 INDEX
B19V IGM SER-ACNC: NEGATIVE
BIOMARKER COMMENT: NORMAL
C3 SERPL-MCNC: 129 MG/DL
C4 SERPL-MCNC: 45 MG/DL
CCP AB SER IA-ACNC: <8 UNITS
CENTROMERE IGG SER-ACNC: <0.2 CD:130001892
CK SERPL-CCNC: 49 U/L
CREAT SPEC-SCNC: 123 MG/DL
CREAT/PROT UR: 0.1 RATIO
CRP SERPL-MCNC: 2.9 MG/L
CRP SERPL-MCNC: <3 MG/L
DSDNA AB SER-ACNC: <12 IU/ML
EGF SERPL-MCNC: 120 PG/ML
ENA RNP AB SER IA-ACNC: 3.3 AL
ENA SCL70 IGG SER IA-ACNC: <0.2 AL
ENA SM AB SER IA-ACNC: <0.2 AL
ENA SS-A AB SER IA-ACNC: 0.2 AL
ENA SS-B AB SER IA-ACNC: <0.2 AL
ERYTHROCYTE [SEDIMENTATION RATE] IN BLOOD BY WESTERGREN METHOD: 38 MM/HR
FOOTNOTE: NORMAL
G6PD SER-CCNC: 12.3 U/G HGB
GLIADIN IGA SER QL: <5 UNITS
GLIADIN IGG SER QL: <5 UNITS
GLIADIN PEPTIDE IGA SER-ACNC: NEGATIVE
GLIADIN PEPTIDE IGG SER-ACNC: NEGATIVE
HAV IGM SER QL: NONREACTIVE
HBV CORE IGM SER QL: NONREACTIVE
HBV SURFACE AG SER QL: NONREACTIVE
HCV AB SER QL: NONREACTIVE
HCV S/CO RATIO: 0.16 S/CO
HIV1+2 AB SPEC QL IA.RAPID: NONREACTIVE
IL6 SERPL-MCNC: 1.9 PG/ML
LEPTIN SERPL-MCNC: 26 NG/ML
Lab: NORMAL
Lab: NORMAL
M TB IFN-G BLD-IMP: NEGATIVE
MMP-1 SERPL-MCNC: 9.7 NG/ML
MMP-3 SERPL-MCNC: 8.9 NG/ML
PROT UR-MCNC: 7 MG/DL
QUANTIFERON TB PLUS MITOGEN MINUS NIL: >10 IU/ML
QUANTIFERON TB PLUS NIL: 0.01 IU/ML
QUANTIFERON TB PLUS TB1 MINUS NIL: 0 IU/ML
QUANTIFERON TB PLUS TB2 MINUS NIL: 0 IU/ML
RA DAS LEVEL QL VECTRADA: NORMAL
RESISTIN SERPL-MCNC: 5.6 NG/ML
RF+CCP IGG SER-IMP: NEGATIVE
RHEUMATOID FACT SER QL: 36 IU/ML
RISK OF RADIOGRAPHIC PROGRESS: 2 %
THYROGLOB AB SERPL-ACNC: <20 IU/ML
THYROPEROXIDASE AB SERPL IA-ACNC: 58.9 IU/ML
TNFRSF1A SERPL-MCNC: 0.52 NG/ML
TTG IGA SER IA-ACNC: <1.2 U/ML
TTG IGA SER-ACNC: NEGATIVE
TTG IGG SER IA-ACNC: <1.2 U/ML
TTG IGG SER IA-ACNC: NEGATIVE
VAP-1 SERPL-MCNC: 0.65 UG/ML
VECTRA SCORE: 20
VEGF-A SERPL-MCNC: 280 PG/ML
YKL-40 RESULT: 51 NG/ML

## 2023-04-10 PROCEDURE — 20610 DRAIN/INJ JOINT/BURSA W/O US: CPT | Mod: 50

## 2023-04-10 PROCEDURE — 99214 OFFICE O/P EST MOD 30 MIN: CPT | Mod: 25

## 2023-04-10 RX ORDER — TRIAMCINOLONE ACETONIDE 40 MG/ML
40 SUSPENSION INTRA-ARTERIAL; INTRAMUSCULAR
Qty: 1 | Refills: 0 | Status: COMPLETED | OUTPATIENT
Start: 2023-04-10

## 2023-04-10 RX ORDER — NORGESTIMATE AND ETHINYL ESTRADIOL 0.25-0.035
0.25-35 KIT ORAL
Refills: 0 | Status: ACTIVE | COMMUNITY

## 2023-04-10 RX ADMIN — TRIAMCINOLONE ACETONIDE 0 MG/ML: 40 INJECTION, SUSPENSION INTRA-ARTICULAR; INTRAMUSCULAR at 00:00

## 2023-04-11 NOTE — ASSESSMENT
[FreeTextEntry1] : 52-year-old female, here for the first follow up reports FH of depression/anxiety, and celiac in her sister; w/ PMH of depression/anxiety in patient, with OA hands w/ +RF=37; high ESR; +IVIS >1:2560 Speckled; +RNP=3.3; with reports of joint pain in b/l hands/MCPs/PIPs w/ morning stiffness all day, w/ pain in Rt>lt ankles, pain in hips, and sicca symptoms concerning for RA at this time.\par \par Seropositive RA: +RF \par -today has tenderness in Rt hand 2nd MCP, Lt hand 4-5 MCPs, Rt>Lt ankle tenderness & she would like to start DMARD with lease immunosuppression \par -reviewed labs 3/31/23 w/ repeat +RF=36; high ESR=38; Vectra=20 low disease activity; +RNP=3.3; +IVIS 1:1280 Speckled; with other serologies stable at this time; 3/22/23 +RF=37; raised ESR=29; high CRP=5; +IVIS >1:2560 Speckled; CBC ok; CMP w/ minimally low Bl=019 (to monitor w/ PCP please) \par -discussed r/b/s of Plaquenil 300 mg PO total, closer to wt based, w/ G6PD normal with pt agreeable and prescription sent as below\par -optho referral provided for retinal screening on Plaquenil as discussed\par -dry eyes: artificial tears PRN w/ check w/ her Optho; RO/LA negative; check Early Sjogren ab to be complete; can be secondary symptom of RA\par -dry mouth: discussed biotene lozenges prescrbied to help\par -Reviewed xray b/l hands 4/6/23 Normal\par -reviewed xray b/l feet 4/6/23 w/ Rt hallux valgus deformity with bunion, OA MTP\par -reviewed xray b/l hips 4/6/23 Normal\par \par +IVIS: +IVIS >1:2560 Speckled \par -Clinically reports of sicca symptoms and raynaud's that can be seen w/ Speckled pattern without much symptoms of lupus at this time and educated on symptoms to monitor for in detail if she evolves.\par -Raynaud's: no ulcers and reports of color changes to white in the hands & feet w/ the cold. Discussed to keep hands warm and if not controlled can add Ca channel blocker if BP allows\par -reviewed labs 3/31/23 w/ +IVIS 1:1280 Speckled; DS-DNA neg; C3 Normal; C4 high (not low); urine prot/creat ratio=0.1\par -lab as below w/ disease activity markers for lupus as below to monitor \par -has +TPO thyroid abs as discussed +IVIS can be seen with cross reactivity\par \par +RNP: likely being seen in the setting of RA \par -referred to Pulm for PFTs/chest imaging for +RNP to r/o ILD as discussed \par -referred for Echo to r/o pulmonary hypertension w/ +RNP\par \par Cervicalgia: tenderness in c-spine w/ rotation w/ good ROM w/o paresthesias \par -has MRI c-spine with & without contrast to r/o RA pannus with +RF and neck pain reported \par -reviewed MRI c-spine [facility] 2/28/22 DDD\par -reviewed xray c-spine 12/22/21 DDD, straightening of normal cervical lordosis \par \par LBP: intermittent \par -Reviewed xray L-spine 4/6/23 fusion uncomplicated\par -reviewed xray SI 4/6/23 Normal\par -reviewed MRI L-spine 1/30/13 w/ DDD, disc herniation \par -states sh had spinal fusion surgery done in past \par -Advil PRN w/ food sparingly if needed \par \par Rt trochanteric bursitis: offered Kenalog 40 mg injection today and patient agreeable.\par Lt trochanteric bursitis: offered Kenalog 40 mg injection today and patient agreeable.\par \par Night sweats: reported by patient \par -referred to Heme/onc for evaluation \par \par  Depression/Anxiety: denies any SI or HI.\par -on bupropion\par \par -educated on symptoms to monitor for in detail and alert us if any concerns.\par -knows to stay up to date on health maintenance w/ PCP\par -f/u in 6-8 weeks w/ labs, MRI c-spine please\par

## 2023-04-11 NOTE — HISTORY OF PRESENT ILLNESS
[FreeTextEntry1] : 52-year-old female here for the first follow up reports history of +RF, +RNP, + IVIS with labs and xrays done to review in detail.\par Patient states she has family history of depression as well as celiac disease in her sister.\par Patient states today she notices pain in the b/l hands in the MCPs/PIPs.\par States she notices stiffness in the joints all day long.\par States she has achy pain in Rt>Lt ankles. \par States she notices intermittent neck pain with rotation without paresthesias at this time.\par States she notices intermittent lower back pain worse with laying; better with stretching. Denies any loss of bowel incontinence or saddle anesthesias. States she has had spinal fusion done in the past.\par States she notices achy pain on the sides of her hips w/ trouble sleeping on her sides.\par States she can notice some intermittent pain in the right more than the left toes at times.\par Denies any fever/chills, no rashes, no ulcers, +dry eyes, + dry mouth, + raynaud's symptoms in the hands & feet turning white in the cold, no infectious diarrhea or  symptoms at this time.\par States she notes SOB perhaps after 2 flights of stairs. \par States she sleeps on 1-2 pillows without SOB that she can recall. \par Denies any history of blood clot, no stroke, 1 , no miscarriages.\par States she notes night sweats and referred to heme/onc to follow up. \par States her depression is controlled on the antidepressant and denies any suicidal ideation or homicidal ideation at this time.\par \par States she is not postmenopausal yet so we will defer DEXA at this time.\par

## 2023-04-11 NOTE — PHYSICAL EXAM
[General Appearance - Alert] : alert [General Appearance - In No Acute Distress] : in no acute distress [Sclera] : the sclera and conjunctiva were normal [Extraocular Movements] : extraocular movements were intact [Outer Ear] : the ears and nose were normal in appearance [Neck Appearance] : the appearance of the neck was normal [Respiration, Rhythm And Depth] : normal respiratory rhythm and effort [Heart Rate And Rhythm] : heart rate was normal and rhythm regular [Heart Sounds] : normal S1 and S2 [Abdomen Soft] : soft [Abdomen Tenderness] : non-tender [Cervical Lymph Nodes Enlarged Anterior Bilaterally] : anterior cervical [Supraclavicular Lymph Nodes Enlarged Bilaterally] : supraclavicular [No CVA Tenderness] : no ~M costovertebral angle tenderness [] : no rash [No Focal Deficits] : no focal deficits [Impaired Insight] : insight and judgment were intact [Mood] : the mood was normal [FreeTextEntry1] :  tenderness in Rt hand 2nd MCP, Lt hand 4-5 MCPs, Rt>Lt ankle tenderness; Rt trochanteric bursa tenderness; Lt trochanteric bursa tenderness; Good ROM in b/l shoulders, no pelvic/girdle stiffness and able to stand up without using her hands

## 2023-04-11 NOTE — PROCEDURE
[Today's Date:] : Date: [unfilled] [Risks] : risks [Benefits] : benefits [Alternatives] : alternatives [Consent Obtained] : written consent was obtained prior to the procedure and is detailed in the patient's record [Patient] : Prior to the start of the procedure a time out was taken and the identity of the patient was confirmed via name and date of birth with the patient. The correct site and the procedure to be performed were confirmed. The correct side was confirmed if applicable. The availability of the correct equipment was verified [Therapeutic] : therapeutic [#1 Site: ______] : #1 site identified in the [unfilled] [Ethyl Chloride] : ethyl chloride [Betadine] : betadine solution [Alcohol] : alcohol [22 gauge 1.5 inch] : A 22 gauge 1.5 inch needle was used [___ml 1% Lidocaine] : [unfilled] ml of 1% lidocaine [Tolerated Well] : the patient tolerated the procedure well [No Complications] : there were no complications [#2 Site: ___] : # 2 site identified in the [unfilled] [FreeTextEntry7] : x2 [FreeTextEntry5] : x2 [de-identified] : x2 [de-identified] : Kenalog 40 mg x 2

## 2023-04-11 NOTE — REASON FOR VISIT
[Follow-Up: _____] : a [unfilled] follow-up visit [FreeTextEntry1] : +RF; joint pain; high ESR; +RNP; +IVIS; review labs/xrays/med; Rt sided hip pain; Lt sided hip pain

## 2023-04-24 ENCOUNTER — APPOINTMENT (OUTPATIENT)
Dept: GASTROENTEROLOGY | Facility: CLINIC | Age: 53
End: 2023-04-24
Payer: MEDICAID

## 2023-04-24 VITALS
SYSTOLIC BLOOD PRESSURE: 113 MMHG | HEART RATE: 66 BPM | DIASTOLIC BLOOD PRESSURE: 73 MMHG | BODY MASS INDEX: 27.82 KG/M2 | HEIGHT: 65 IN | WEIGHT: 167 LBS

## 2023-04-24 PROCEDURE — 99204 OFFICE O/P NEW MOD 45 MIN: CPT

## 2023-04-24 NOTE — ASSESSMENT
[FreeTextEntry1] : 52 F presenting for eval of constipation. She reports over the last 3 years she has had progressively worsening constipation, which coincides with the beginning of menopause. Has tried OTC methods with little relief. Will start Linzess today, samples provided. Will plan for colonoscopy. Discussed with patient prep, procedure, and risks such as missed lesions/polyps, bleeding, and perforation of the bowel. Verbalized understanding. Prep sent to pharmacy. All questions answered. Discussed with Dr. Jiménez.

## 2023-04-24 NOTE — HISTORY OF PRESENT ILLNESS
[FreeTextEntry1] : 52 F presenting for eval of constipation. She reports over the last 3 years she has had progressively worsening constipation. Has exhausted OTC methods with little relief. Additionally has never had a colonoscopy. No family hx of GI disease or cancer. Denies chest pain, shortness of breath, nausea, vomiting, abdominal pain, diarrhea, melena, hematochezia, unintentional weight changes, fevers, chills.

## 2023-05-02 ENCOUNTER — NON-APPOINTMENT (OUTPATIENT)
Age: 53
End: 2023-05-02

## 2023-05-02 ENCOUNTER — APPOINTMENT (OUTPATIENT)
Dept: CARDIOLOGY | Facility: CLINIC | Age: 53
End: 2023-05-02
Payer: MEDICAID

## 2023-05-02 VITALS
DIASTOLIC BLOOD PRESSURE: 70 MMHG | HEIGHT: 65 IN | WEIGHT: 161 LBS | BODY MASS INDEX: 26.82 KG/M2 | HEART RATE: 65 BPM | OXYGEN SATURATION: 100 % | SYSTOLIC BLOOD PRESSURE: 100 MMHG

## 2023-05-02 PROCEDURE — 93000 ELECTROCARDIOGRAM COMPLETE: CPT

## 2023-05-02 PROCEDURE — 99205 OFFICE O/P NEW HI 60 MIN: CPT | Mod: 25

## 2023-05-02 RX ORDER — KETOROLAC TROMETHAMINE 10 MG/1
10 TABLET, FILM COATED ORAL
Qty: 20 | Refills: 0 | Status: DISCONTINUED | COMMUNITY
Start: 2023-02-05 | End: 2023-05-02

## 2023-05-02 RX ORDER — SODIUM SULFATE, POTASSIUM SULFATE AND MAGNESIUM SULFATE 1.6; 3.13; 17.5 G/177ML; G/177ML; G/177ML
17.5-3.13-1.6 SOLUTION ORAL
Qty: 1 | Refills: 0 | Status: DISCONTINUED | COMMUNITY
Start: 2023-04-24 | End: 2023-05-02

## 2023-05-02 RX ORDER — ALPRAZOLAM 0.5 MG/1
0.5 TABLET ORAL
Qty: 30 | Refills: 0 | Status: DISCONTINUED | COMMUNITY
Start: 2023-03-28 | End: 2023-05-02

## 2023-05-03 NOTE — ASSESSMENT
[FreeTextEntry1] : A/P:\par \par *+RNP-r/o pulmonary  hypertension\par >No symptoms of pulmonary hypertension.\par \par >echo ordered.  Return same day to review results.

## 2023-05-03 NOTE — HISTORY OF PRESENT ILLNESS
[FreeTextEntry1] : EDISONLANA (LANA)1970(52y)F\par \par "consult per rheum"\par \par Reviewed rheum note Apr-10'-'23 by Dr. AMADEO Berman:\par "A/P: +IVIS: +IVIS >1:2560 Speckled....\par referred to pulm for PFTs....referred for echo to r/o \par pulmonary hypertension w/ +RNP"\par \par 53 y/o \par \par seen by Rheum for +RF, +IVIS, high ESR/CRP.\par has OA of hands. Per rheum note concern for inflammatory arthropathy:\par RA, Sjogrens, spondyloarthropathy.\par \par Also h/o depression.  No prior cardiac history.\par \par Reviewed history. Had +RF ordered for hip pain & joint discomfort\par by PCP & then referred to rheum.  Confirmed w/ pt that Rheum\par suspected Rheumatoid arthritis, also suspect sjogren's syndrome.\par \par 2 flights of stairs up to 4 flights.  4 city blocks. \par No regular exercise b/c of joint pain.  No dyspnea, no orthopnea/DNA, no LE edema.\par No chest pressure,chest pain.   No palpitations/ syncope/lighthheadness.\par \par on hydrxoychloro for RA, wellbutrin for depression, spironolact. for acne.\par D+C, laminectomy, \par \par Mom-MI in 80s, \par sochx neg x 3\par works as a .\par \par *ECG NSR no ischemic changes no RBBB/RVH\par *lipids Mar '23:  HDL 66   ASCVD 1.2% "emphasize lifestyle to reduce risk"

## 2023-05-04 DIAGNOSIS — K59.09 OTHER CONSTIPATION: ICD-10-CM

## 2023-05-05 ENCOUNTER — EMERGENCY (EMERGENCY)
Facility: HOSPITAL | Age: 53
LOS: 0 days | Discharge: HOME | End: 2023-05-05
Attending: HOSPITALIST
Payer: MEDICAID

## 2023-05-05 VITALS
OXYGEN SATURATION: 99 % | WEIGHT: 149.91 LBS | HEART RATE: 73 BPM | HEIGHT: 66 IN | TEMPERATURE: 98 F | SYSTOLIC BLOOD PRESSURE: 146 MMHG | DIASTOLIC BLOOD PRESSURE: 95 MMHG | RESPIRATION RATE: 18 BRPM

## 2023-05-05 VITALS
DIASTOLIC BLOOD PRESSURE: 67 MMHG | TEMPERATURE: 98 F | RESPIRATION RATE: 18 BRPM | HEART RATE: 75 BPM | SYSTOLIC BLOOD PRESSURE: 117 MMHG | OXYGEN SATURATION: 99 %

## 2023-05-05 DIAGNOSIS — K52.9 NONINFECTIVE GASTROENTERITIS AND COLITIS, UNSPECIFIED: ICD-10-CM

## 2023-05-05 DIAGNOSIS — R11.0 NAUSEA: ICD-10-CM

## 2023-05-05 DIAGNOSIS — E78.5 HYPERLIPIDEMIA, UNSPECIFIED: ICD-10-CM

## 2023-05-05 DIAGNOSIS — K59.00 CONSTIPATION, UNSPECIFIED: ICD-10-CM

## 2023-05-05 DIAGNOSIS — R10.9 UNSPECIFIED ABDOMINAL PAIN: ICD-10-CM

## 2023-05-05 DIAGNOSIS — Z91.018 ALLERGY TO OTHER FOODS: ICD-10-CM

## 2023-05-05 DIAGNOSIS — R11.10 VOMITING, UNSPECIFIED: ICD-10-CM

## 2023-05-05 DIAGNOSIS — I73.00 RAYNAUD'S SYNDROME WITHOUT GANGRENE: ICD-10-CM

## 2023-05-05 DIAGNOSIS — F32.A DEPRESSION, UNSPECIFIED: ICD-10-CM

## 2023-05-05 LAB
ALBUMIN SERPL ELPH-MCNC: 3.7 G/DL — SIGNIFICANT CHANGE UP (ref 3.3–5)
ALP SERPL-CCNC: 55 U/L — SIGNIFICANT CHANGE UP (ref 40–120)
ALT FLD-CCNC: 30 U/L — SIGNIFICANT CHANGE UP (ref 12–78)
ANION GAP SERPL CALC-SCNC: 7 MMOL/L — SIGNIFICANT CHANGE UP (ref 5–17)
APPEARANCE UR: CLEAR — SIGNIFICANT CHANGE UP
AST SERPL-CCNC: 24 U/L — SIGNIFICANT CHANGE UP (ref 15–37)
BACTERIA # UR AUTO: ABNORMAL
BASOPHILS # BLD AUTO: 0.08 K/UL — SIGNIFICANT CHANGE UP (ref 0–0.2)
BASOPHILS NFR BLD AUTO: 0.7 % — SIGNIFICANT CHANGE UP (ref 0–2)
BILIRUB SERPL-MCNC: 0.3 MG/DL — SIGNIFICANT CHANGE UP (ref 0.2–1.2)
BILIRUB UR-MCNC: ABNORMAL
BUN SERPL-MCNC: 13 MG/DL — SIGNIFICANT CHANGE UP (ref 7–23)
CALCIUM SERPL-MCNC: 12 MG/DL — HIGH (ref 8.5–10.1)
CHLORIDE SERPL-SCNC: 97 MMOL/L — SIGNIFICANT CHANGE UP (ref 96–108)
CO2 SERPL-SCNC: 27 MMOL/L — SIGNIFICANT CHANGE UP (ref 22–31)
COLOR SPEC: YELLOW — SIGNIFICANT CHANGE UP
CREAT SERPL-MCNC: 1.09 MG/DL — SIGNIFICANT CHANGE UP (ref 0.5–1.3)
DIFF PNL FLD: ABNORMAL
EGFR: 61 ML/MIN/1.73M2 — SIGNIFICANT CHANGE UP
EOSINOPHIL # BLD AUTO: 0.09 K/UL — SIGNIFICANT CHANGE UP (ref 0–0.5)
EOSINOPHIL NFR BLD AUTO: 0.8 % — SIGNIFICANT CHANGE UP (ref 0–6)
GI PCR PANEL: SIGNIFICANT CHANGE UP
GLUCOSE SERPL-MCNC: 106 MG/DL — HIGH (ref 70–99)
GLUCOSE UR QL: NEGATIVE — SIGNIFICANT CHANGE UP
HCG SERPL-ACNC: <1 MIU/ML — SIGNIFICANT CHANGE UP
HCT VFR BLD CALC: 45.9 % — HIGH (ref 34.5–45)
HGB BLD-MCNC: 15.3 G/DL — SIGNIFICANT CHANGE UP (ref 11.5–15.5)
IMM GRANULOCYTES NFR BLD AUTO: 0.3 % — SIGNIFICANT CHANGE UP (ref 0–0.9)
KETONES UR-MCNC: NEGATIVE — SIGNIFICANT CHANGE UP
LEUKOCYTE ESTERASE UR-ACNC: ABNORMAL
LIDOCAIN IGE QN: 88 U/L — SIGNIFICANT CHANGE UP (ref 73–393)
LYMPHOCYTES # BLD AUTO: 1.89 K/UL — SIGNIFICANT CHANGE UP (ref 1–3.3)
LYMPHOCYTES # BLD AUTO: 16.8 % — SIGNIFICANT CHANGE UP (ref 13–44)
MCHC RBC-ENTMCNC: 31.7 PG — SIGNIFICANT CHANGE UP (ref 27–34)
MCHC RBC-ENTMCNC: 33.3 GM/DL — SIGNIFICANT CHANGE UP (ref 32–36)
MCV RBC AUTO: 95 FL — SIGNIFICANT CHANGE UP (ref 80–100)
MONOCYTES # BLD AUTO: 0.31 K/UL — SIGNIFICANT CHANGE UP (ref 0–0.9)
MONOCYTES NFR BLD AUTO: 2.8 % — SIGNIFICANT CHANGE UP (ref 2–14)
NEUTROPHILS # BLD AUTO: 8.86 K/UL — HIGH (ref 1.8–7.4)
NEUTROPHILS NFR BLD AUTO: 78.6 % — HIGH (ref 43–77)
NITRITE UR-MCNC: NEGATIVE — SIGNIFICANT CHANGE UP
PH UR: 5 — SIGNIFICANT CHANGE UP (ref 5–8)
PLATELET # BLD AUTO: 381 K/UL — SIGNIFICANT CHANGE UP (ref 150–400)
POTASSIUM SERPL-MCNC: 4.1 MMOL/L — SIGNIFICANT CHANGE UP (ref 3.5–5.3)
POTASSIUM SERPL-SCNC: 4.1 MMOL/L — SIGNIFICANT CHANGE UP (ref 3.5–5.3)
PROT SERPL-MCNC: 8.6 GM/DL — HIGH (ref 6–8.3)
PROT UR-MCNC: 15
RBC # BLD: 4.83 M/UL — SIGNIFICANT CHANGE UP (ref 3.8–5.2)
RBC # FLD: 11.8 % — SIGNIFICANT CHANGE UP (ref 10.3–14.5)
RBC CASTS # UR COMP ASSIST: SIGNIFICANT CHANGE UP /HPF (ref 0–4)
SODIUM SERPL-SCNC: 131 MMOL/L — LOW (ref 135–145)
SP GR SPEC: 1.01 — SIGNIFICANT CHANGE UP (ref 1.01–1.02)
UROBILINOGEN FLD QL: 1
WBC # BLD: 11.26 K/UL — HIGH (ref 3.8–10.5)
WBC # FLD AUTO: 11.26 K/UL — HIGH (ref 3.8–10.5)
WBC UR QL: SIGNIFICANT CHANGE UP /HPF (ref 0–5)

## 2023-05-05 PROCEDURE — 87086 URINE CULTURE/COLONY COUNT: CPT

## 2023-05-05 PROCEDURE — 83690 ASSAY OF LIPASE: CPT

## 2023-05-05 PROCEDURE — 84702 CHORIONIC GONADOTROPIN TEST: CPT

## 2023-05-05 PROCEDURE — 96375 TX/PRO/DX INJ NEW DRUG ADDON: CPT

## 2023-05-05 PROCEDURE — 96374 THER/PROPH/DIAG INJ IV PUSH: CPT | Mod: XU

## 2023-05-05 PROCEDURE — 87507 IADNA-DNA/RNA PROBE TQ 12-25: CPT

## 2023-05-05 PROCEDURE — 99284 EMERGENCY DEPT VISIT MOD MDM: CPT | Mod: 25

## 2023-05-05 PROCEDURE — 74177 CT ABD & PELVIS W/CONTRAST: CPT | Mod: 26,MA

## 2023-05-05 PROCEDURE — 81001 URINALYSIS AUTO W/SCOPE: CPT

## 2023-05-05 PROCEDURE — 85025 COMPLETE CBC W/AUTO DIFF WBC: CPT

## 2023-05-05 PROCEDURE — 74177 CT ABD & PELVIS W/CONTRAST: CPT | Mod: MA

## 2023-05-05 PROCEDURE — 80053 COMPREHEN METABOLIC PANEL: CPT

## 2023-05-05 PROCEDURE — 36415 COLL VENOUS BLD VENIPUNCTURE: CPT

## 2023-05-05 PROCEDURE — 99285 EMERGENCY DEPT VISIT HI MDM: CPT

## 2023-05-05 RX ORDER — ONDANSETRON 8 MG/1
4 TABLET, FILM COATED ORAL ONCE
Refills: 0 | Status: COMPLETED | OUTPATIENT
Start: 2023-05-05 | End: 2023-05-05

## 2023-05-05 RX ORDER — KETOROLAC TROMETHAMINE 30 MG/ML
30 SYRINGE (ML) INJECTION ONCE
Refills: 0 | Status: DISCONTINUED | OUTPATIENT
Start: 2023-05-05 | End: 2023-05-05

## 2023-05-05 RX ORDER — SODIUM CHLORIDE 9 MG/ML
1000 INJECTION INTRAMUSCULAR; INTRAVENOUS; SUBCUTANEOUS ONCE
Refills: 0 | Status: COMPLETED | OUTPATIENT
Start: 2023-05-05 | End: 2023-05-05

## 2023-05-05 RX ADMIN — ONDANSETRON 4 MILLIGRAM(S): 8 TABLET, FILM COATED ORAL at 06:16

## 2023-05-05 RX ADMIN — SODIUM CHLORIDE 1000 MILLILITER(S): 9 INJECTION INTRAMUSCULAR; INTRAVENOUS; SUBCUTANEOUS at 11:35

## 2023-05-05 RX ADMIN — Medication 30 MILLIGRAM(S): at 06:16

## 2023-05-05 RX ADMIN — SODIUM CHLORIDE 1000 MILLILITER(S): 9 INJECTION INTRAMUSCULAR; INTRAVENOUS; SUBCUTANEOUS at 06:15

## 2023-05-05 NOTE — ED PROVIDER NOTE - DIFFERENTIAL DIAGNOSIS
Acute on chronic abdominal pain, appendicitis, diverticulitis, colitis, acute gastroenteritis Differential Diagnosis

## 2023-05-05 NOTE — ED PROVIDER NOTE - OBJECTIVE STATEMENT
52-year-old female presenting with abdominal pain.  Patient states she has had cramping abdominal pain for the past few days with nausea.  She has had similar symptoms in the past and was recently put on Linzess by her gastroenterologist.  Had a follow-up appointment yesterday and they recommended changing the medication to something else (patient cannot remember  the name of the medication and has not yet started it).  States that she is only had worsening pain  and developed vomiting.  no diarrhea.  No fever

## 2023-05-05 NOTE — ED ADULT NURSE NOTE - OBJECTIVE STATEMENT
Pt presents to ED c/o abdominal pain. Pt w/ RLQ pain x2 days pt was seen by GI yesterday and taken off Linzess. Pt denies fever/ diarrhea. Pt endorsing vomiting, feels dehydrated. IV fluids started, pt awaiting CT scan.

## 2023-05-05 NOTE — ED PROVIDER NOTE - PROGRESS NOTE DETAILS
CC:  Signout received from Dr. Mccall to f/u CT report, reassess.  CT A/P: + + ednh4tx stool throughout colon, which is distended incl. cecum at 9 cm, no obstruction.  Pt examined: reports feels + improved c/w 1st arrived, Abd soft, NT.  + D while in ED, reports + constipation earlier this week.  Pt follows with [facility] NP Kahlil Nuñez.  Calling [facility] to discuss case cj GI PCR ordered. CC:  Case d/w GI NP under Dr. Jiménez, incl. labs, CT report: they advise pt DC for office f/u 5/8, po Miralax/Gatorade prep in meanwhile: NP will discuss directly with pt.

## 2023-05-05 NOTE — ED PROVIDER NOTE - NSFOLLOWUPINSTRUCTIONS_ED_ALL_ED_FT
Continue current medications as per routine.  Mirilax/Gatorade prep: GI NP with communicate with your directly about this further.  GI follow up Monday, 5/8: call today for appointment time.      Colitis    Colitis is a condition in which the colon is inflamed. It can cause diarrhea, blood in the stool, and abdominal pain. Colitis can last a short time (be acute), or it may last a long time (become chronic).    What are the causes?  This condition may be caused by:  Infections from viruses or bacteria.  A reaction to medicine.  Certain autoimmune diseases, such as Crohn's disease or ulcerative colitis.  Radiation treatment.  Decreased blood flow to the bowel (ischemia).  What are the signs or symptoms?  Symptoms of this condition include:  Diarrhea, blood in the stool, or black, tarry stool.  Pain in the joints or abdominal pain.  Fever or fatigue.  Vomiting.  Weight loss.  Bloating.  Having fewer bowel movements than usual.  A strong and sudden urge to have a bowel movement.  Feeling like the bowel is not empty after a bowel movement.  How is this diagnosed?  This condition may be diagnosed based on a stool test and a blood test. You may also have other tests, such as:  X-rays.  CT scan.  Colonoscopy.  Endoscopy.  Biopsy.  How is this treated?  Treatment for this condition depends on the cause. This condition may be treated with:  Steps to rest the bowel, such as not eating or drinking for a period of time.  Fluids that are given through an IV.  Medicine for pain and diarrhea.  Antibiotic medicines.  Cortisone medicines.  Surgery.  Follow these instructions at home:  Eating and drinking      Follow instructions from your health care provider about eating or drinking restrictions.  Drink enough fluid to keep your urine pale yellow.  Work with a dietitian to determine whether certain foods cause your condition to flare up.  Avoid foods or drinks that cause flare-ups.  Eat a well-balanced diet.  General instructions    If you were prescribed an antibiotic medicine, take it as told by your health care provider. Do not stop taking the antibiotic even if you start to feel better.  Take over-the-counter and prescription medicines only as told by your health care provider.  Keep all follow-up visits. This is important.  Contact a health care provider if:  Your symptoms do not go away.  You develop new symptoms.  Get help right away if:  You have a fever that does not go away with treatment.  You develop chills.  You have extreme weakness, fainting, or dehydration.  You vomit repeatedly.  You develop severe pain in your abdomen.  You pass bloody or tarry stool.  Summary  Colitis is a condition in which the colon is inflamed. Colitis can last a short time (be acute), or it may last a long time (become chronic).  Treatment for this condition depends on the cause and may include resting the bowel, taking medicines, or having surgery.  If you were prescribed an antibiotic medicine, take it as told by your health care provider. Do not stop taking the antibiotic even if you start to feel better.  Get help right away if you develop severe pain in your abdomen.  Keep all follow-up visits. This is important.  This information is not intended to replace advice given to you by your health care provider. Make sure you discuss any questions you have with your health care provider.

## 2023-05-05 NOTE — ED PROVIDER NOTE - CARE PROVIDER_API CALL
Vivek Jiménez)  Gastroenterology; Internal Medicine  [address] [place] [address]  Phone: [phone]  Fax: [phone]  Follow Up Time:

## 2023-05-05 NOTE — ED ADULT TRIAGE NOTE - CHIEF COMPLAINT QUOTE
Ambulatory to ER with c.o RLQ abdominal pain; seen by GI yesterday and taken off Linzess, patient to start new medication unsure of name. Patient actively vomiting in triage; denies fever/ diarrhea. Ambulatory to ER with c/o RLQ abdominal pain; seen by GI yesterday and taken off Linzess, patient to start new medication unsure of name. Patient actively vomiting in triage; denies fever/ diarrhea.

## 2023-05-05 NOTE — ED PROVIDER NOTE - PHYSICAL EXAMINATION
***GEN - NAD; well appearing; A+O x3 ***HEAD - NC/AT ***EYES/NOSE - PERRL, EOMI, mucous membranes moist, no discharge ***THROAT: Oral cavity and pharynx normal. No inflammation, swelling, exudate, or lesions.  ***NECK: Neck supple  ***PULMONARY - CTA b/l, symmetric breath sounds. ***CARDIAC -s1s2, RRR, no M,G,R  ***ABDOMEN - +BS, ND, +RLQ pain, soft, no guarding, no rebound, no masses   ***BACK - no CVA tenderness, Normal  spine ***EXTREMITIES - symmetric pulses, 2+ dp, capillary refill < 2 seconds ***SKIN - no rash or bruising   ***NEUROLOGIC - alert, CN 2-12 intact

## 2023-05-05 NOTE — ED ADULT NURSE NOTE - NSIMPLEMENTINTERV_GEN_ALL_ED
Implemented All Universal Safety Interventions:  Cylinder to call system. Call bell, personal items and telephone within reach. Instruct patient to call for assistance. Room bathroom lighting operational. Non-slip footwear when patient is off stretcher. Physically safe environment: no spills, clutter or unnecessary equipment. Stretcher in lowest position, wheels locked, appropriate side rails in place.

## 2023-05-05 NOTE — ED PROVIDER NOTE - NSICDXPASTMEDICALHX_GEN_ALL_CORE_FT
PAST MEDICAL HISTORY:  Bronchitis Pneumonia  10 y ago    Depression     Hyperlipidemia     Pyelonephritis 1987    Raynaud's syndrome     UTI (lower urinary tract infection) 6 months ago

## 2023-05-05 NOTE — ED PROVIDER NOTE - PATIENT PORTAL LINK FT
You can access the FollowMyHealth Patient Portal offered by [facility] by registering at the following website: [email or web address]. By joining [employer]’s FollowMyHealth portal, you will also be able to view your health information using other applications (apps) compatible with our system.

## 2023-05-05 NOTE — ED PROVIDER NOTE - CLINICAL SUMMARY MEDICAL DECISION MAKING FREE TEXT BOX
52-year-old female with right lower quadrant abdominal pain and vomiting.  Will obtain labs treat with fluids pain medication and IV Zofran and obtain CT abdomen pelvis to further evaluate.

## 2023-05-05 NOTE — ED ADULT NURSE NOTE - CHIEF COMPLAINT QUOTE
Ambulatory to ER with c/o RLQ abdominal pain; seen by GI yesterday and taken off Linzess, patient to start new medication unsure of name. Patient actively vomiting in triage; denies fever/ diarrhea.

## 2023-05-06 LAB
CULTURE RESULTS: SIGNIFICANT CHANGE UP
SPECIMEN SOURCE: SIGNIFICANT CHANGE UP

## 2023-05-08 ENCOUNTER — APPOINTMENT (OUTPATIENT)
Dept: GASTROENTEROLOGY | Facility: CLINIC | Age: 53
End: 2023-05-08
Payer: MEDICAID

## 2023-05-08 VITALS
WEIGHT: 157 LBS | DIASTOLIC BLOOD PRESSURE: 71 MMHG | HEART RATE: 74 BPM | BODY MASS INDEX: 26.16 KG/M2 | HEIGHT: 65 IN | SYSTOLIC BLOOD PRESSURE: 107 MMHG

## 2023-05-08 PROCEDURE — 99214 OFFICE O/P EST MOD 30 MIN: CPT

## 2023-05-08 RX ORDER — LINACLOTIDE 72 UG/1
72 CAPSULE, GELATIN COATED ORAL
Qty: 30 | Refills: 3 | Status: DISCONTINUED | COMMUNITY
Start: 2023-04-24 | End: 2023-05-08

## 2023-05-08 NOTE — HISTORY OF PRESENT ILLNESS
[FreeTextEntry1] : Nita Dumont is a 52 year old female presents today for follow up for constipation. pt was seen several weeks ago, has been experiencing worsening constipation for 3 years. Was prescribed linzess 72MCG without relief, was increased to 145MCG again without relief. Pt call the office last week, prescribed alternative Amitiza however there was a pharmacy issue and patient was unable to get the medication. Over the weekend she started having severe abdominal pain with her constipation so she went to  ED. Was observed on CT to have severe stool burden, discharged home with instructions to do miralax prep to alleviate symptoms. Since doing so, pain is resolved but pt is hesitant it will return since she is still constipated.

## 2023-05-08 NOTE — REVIEW OF SYSTEMS
[Abdominal Pain] : no abdominal pain [Vomiting] : no vomiting [Constipation] : constipation [Diarrhea] : no diarrhea [Heartburn] : no heartburn [Melena (black stool)] : no melena [Bleeding] : no bleeding [Fecal Incontinence (soiling)] : no fecal incontinence [Bloating (gassiness)] : no bloating [Negative] : Heme/Lymph

## 2023-05-08 NOTE — ASSESSMENT
[FreeTextEntry1] : Plan:\par Discussed importance of high fiber diet and oral hydration. Would also recommend miralax 2-3 times daily since pt reports once daily did not help her constipation. Will call [facility] to inquire about amitiza as well. Informed pt if that offers no relief she should call office ASAP at which point can try 24MCG BID. Pt expressed understanding, all questions answered. Discussed with Dr. Jiménez, I have spent 30 minutes on this encounter.

## 2023-05-11 DIAGNOSIS — R10.9 UNSPECIFIED ABDOMINAL PAIN: ICD-10-CM

## 2023-06-02 ENCOUNTER — APPOINTMENT (OUTPATIENT)
Dept: GASTROENTEROLOGY | Facility: AMBULATORY MEDICAL SERVICES | Age: 53
End: 2023-06-02
Payer: MEDICAID

## 2023-06-02 PROCEDURE — 45378 DIAGNOSTIC COLONOSCOPY: CPT

## 2023-06-05 ENCOUNTER — RX RENEWAL (OUTPATIENT)
Age: 53
End: 2023-06-05

## 2023-06-09 ENCOUNTER — APPOINTMENT (OUTPATIENT)
Dept: CARDIOLOGY | Facility: CLINIC | Age: 53
End: 2023-06-09
Payer: MEDICAID

## 2023-06-09 VITALS
HEART RATE: 81 BPM | OXYGEN SATURATION: 100 % | WEIGHT: 161 LBS | SYSTOLIC BLOOD PRESSURE: 94 MMHG | BODY MASS INDEX: 26.82 KG/M2 | DIASTOLIC BLOOD PRESSURE: 70 MMHG | HEIGHT: 65 IN

## 2023-06-09 PROCEDURE — 99214 OFFICE O/P EST MOD 30 MIN: CPT | Mod: GC

## 2023-06-09 PROCEDURE — 93306 TTE W/DOPPLER COMPLETE: CPT

## 2023-06-09 RX ORDER — LUBIPROSTONE 8 UG/1
8 CAPSULE, GELATIN COATED ORAL
Qty: 60 | Refills: 2 | Status: DISCONTINUED | COMMUNITY
Start: 2023-05-08 | End: 2023-06-09

## 2023-06-09 RX ORDER — LUBIPROSTONE 8 UG/1
8 CAPSULE ORAL
Qty: 60 | Refills: 2 | Status: DISCONTINUED | COMMUNITY
Start: 2023-05-04 | End: 2023-06-09

## 2023-06-09 RX ORDER — LINACLOTIDE 145 UG/1
145 CAPSULE, GELATIN COATED ORAL
Qty: 30 | Refills: 3 | Status: DISCONTINUED | COMMUNITY
Start: 2023-05-02 | End: 2023-06-09

## 2023-06-11 NOTE — HISTORY OF PRESENT ILLNESS
[FreeTextEntry1] : here for followup.\par \par Referred for echo to evaluate for pulmonary hypertension\par given possible rheumatologic disease.\par REferred by rheumatology.\par \par REviewed results:\par EF 60-65% no RWM, mild MR, mild TR PASP 26 mmHg.\par \par No new symptoms.

## 2023-06-11 NOTE — ASSESSMENT
[FreeTextEntry1] : A/P:\par \par *r/o pulmonary hypertension\par -No pulmonary hypertension on echo, no symptoms\par -FU w/ Rheum, no followup visit scheduled.

## 2023-06-12 LAB
ALBUMIN SERPL ELPH-MCNC: 4.8 G/DL
ALP BLD-CCNC: 62 U/L
ALT SERPL-CCNC: 22 U/L
ANION GAP SERPL CALC-SCNC: 13 MMOL/L
AST SERPL-CCNC: 25 U/L
BILIRUB SERPL-MCNC: 0.5 MG/DL
BUN SERPL-MCNC: 15 MG/DL
C3 SERPL-MCNC: 158 MG/DL
C4 SERPL-MCNC: 45 MG/DL
CALCIUM SERPL-MCNC: 10.3 MG/DL
CHLORIDE SERPL-SCNC: 99 MMOL/L
CO2 SERPL-SCNC: 24 MMOL/L
CREAT SERPL-MCNC: 1.05 MG/DL
CREAT SPEC-SCNC: 95 MG/DL
CREAT/PROT UR: 0.1 RATIO
CRP SERPL-MCNC: 5 MG/L
DSDNA AB SER-ACNC: <12 IU/ML
EGFR: 64 ML/MIN/1.73M2
ENA RNP AB SER IA-ACNC: 3.9 AL
ENA SM AB SER IA-ACNC: <0.2 AL
ERYTHROCYTE [SEDIMENTATION RATE] IN BLOOD BY WESTERGREN METHOD: 25 MM/HR
GLUCOSE SERPL-MCNC: 76 MG/DL
POTASSIUM SERPL-SCNC: 4.5 MMOL/L
PROT SERPL-MCNC: 8 G/DL
PROT UR-MCNC: 6 MG/DL
SODIUM SERPL-SCNC: 137 MMOL/L

## 2023-06-13 LAB
ANA PAT FLD IF-IMP: ABNORMAL
ANA SER IF-ACNC: ABNORMAL

## 2023-06-15 ENCOUNTER — TRANSCRIPTION ENCOUNTER (OUTPATIENT)
Age: 53
End: 2023-06-15

## 2023-06-15 ENCOUNTER — APPOINTMENT (OUTPATIENT)
Dept: RHEUMATOLOGY | Facility: CLINIC | Age: 53
End: 2023-06-15
Payer: MEDICAID

## 2023-06-15 VITALS
WEIGHT: 162 LBS | SYSTOLIC BLOOD PRESSURE: 111 MMHG | DIASTOLIC BLOOD PRESSURE: 67 MMHG | TEMPERATURE: 97.7 F | OXYGEN SATURATION: 96 % | HEIGHT: 65 IN | BODY MASS INDEX: 26.99 KG/M2 | HEART RATE: 69 BPM

## 2023-06-15 PROCEDURE — 99214 OFFICE O/P EST MOD 30 MIN: CPT

## 2023-06-15 NOTE — HISTORY OF PRESENT ILLNESS
[FreeTextEntry1] : 52-year-old female, here for the first follow up reports FH of depression/anxiety, and celiac in her sister; w/ PMH of depression/anxiety in patient, with OA hands w/ +RF=37; high ESR; +IVIS >1:2560 Speckled; +RNP=3.9 (from 3.3); with reports of joint pain in b/l hands/MCPs/PIPs w/ morning stiffness all day, w/ pain in Rt>lt ankles, pain in hips, and sicca symptoms concerning for RA.\par \par Today she states she is taking  mg total that she is tolerating well at this time.\par States she did labs and MRI c-spine done to review in detail.\par States she  saw Dr. Ankur Owens 2023 with eye exam normal on HCQ and told to f/u in 1 yr with note requested.\par Denies any swelling or redness or warmth of any joints on med now.\par States she notices intermittent neck pain with rotation without paresthesias at this time.\par States she notices intermittent lower back pain worse with laying; better with stretching; not much pain today. Denies any loss of bowel incontinence or saddle anesthesias. States she has had spinal fusion done in the past.\par Denies any fever/chills, no rashes, no ulcers, +dry eyes, + dry mouth, + raynaud's symptoms in the hands & feet turning white in the cold, no infectious diarrhea or  symptoms at this time.\par States she notes SOB perhaps after 2 flights of stairs. \par States she sleeps on 1-2 pillows without SOB that she can recall. \par Denies any history of blood clot, no stroke, 1 , no miscarriages.\par States her depression is controlled on the antidepressant and denies any suicidal ideation or homicidal ideation at this time.\par \par States she is not postmenopausal yet so we will defer DEXA at this time.\par \par  \par

## 2023-06-15 NOTE — REASON FOR VISIT
[Follow-Up: _____] : a [unfilled] follow-up visit [FreeTextEntry1] : RA; high ESR/CRP; +RNP; +IVIS; review labs/MRI c-spine/med\par  \par

## 2023-06-15 NOTE — ASSESSMENT
[FreeTextEntry1] : 52-year-old female, here for the first follow up reports FH of depression/anxiety, and celiac in her sister; w/ PMH of depression/anxiety in patient, with OA hands w/ +RF=37; high ESR; +IVIS >1:2560 Speckled; +RNP=3.9 (from 3.3); with reports of joint pain in b/l hands/MCPs/PIPs w/ morning stiffness all day, w/ pain in Rt>lt ankles, pain in hips, and sicca symptoms concerning for RA.\par \par Seropositive RA: stable today without synovitis \par -reviewed labs 6/9/23 w/ decreasing ESR=25 (from 38); high CRP=5; +RNP=3.9 (from 3.3); +IVIS 1:2560 speckled; Ds-DNA neg; C3 high (not low); C4 high (not low); urine prot/creat ratio= 0.1; 3/31/23 w/ repeat +RF=36; high ESR=38; Vectra=20 low disease activity; +RNP=3.3; +IVIS 1:1280 Speckled; with other serologies stable at this time; 3/22/23 +RF=37; raised ESR=29; high CRP=5; +IVIS >1:2560 Speckled; CBC ok; CMP w/ minimally low Mc=695 (to monitor w/ PCP please) \par -discussed r/b/s of refilling Plaquenil 300 mg PO total, closer to wt based, w/ G6PD normal with pt agreeable and prescription sent as below\par -states she saw Optho, Dr. Ankur Menjivar 4/2023 with eye exam normal on HCQ and told to f/u in 1 yr with note requested \par -dry eyes: artificial tears PRN & states not noted w/ Optho; RO/LA negative; Early Sjogren ab pending; can be secondary symptom of RA\par -dry mouth: discussed biotene lozenges prescrbied to help\par -xray b/l hands 4/6/23 Normal\par -xray b/l feet 4/6/23 w/ Rt hallux valgus deformity with bunion, OA MTP\par -xray b/l hips 4/6/23 Normal\par \par +IVIS: +IVIS >1:2560 Speckled \par -Clinically reports of sicca symptoms and raynaud's that can be seen w/ Speckled pattern without much symptoms of lupus at this time and educated on symptoms to monitor for in detail if she evolves.\par -Raynaud's: no ulcers and reports of color changes to white in the hands & feet w/ the cold. Discussed to keep hands warm and if not controlled can add Ca channel blocker if BP allows\par -reviewed labs 6/9//23 w/ +IVIS 1:2560 Speckled; DS-DNA neg; C3 high (not low); C4 high (not low); urine prot/creat ratio=0.1\par -lab as below w/ disease activity markers to monitor intermittently \par -has +TPO thyroid abs as discussed +IVIS can be seen with cross reactivity\par \par +RNP: likely being seen in the setting of RA \par -states has appt w/ Pulm, Dr. Amaya for PFTs/chest imaging for +RNP to r/o ILD as discussed \par -states Echo w/ cardiology, Dr. Hernandez 5/2/23 was ok without pulmonary hypertension w/ +RNP\par \par Cervicalgia: intermittent; not much pain today \par -reviewed MRI c-spine 4/6/23 No pannus; spondylosis/DDD\par -MRI c-spine [facility] 2/28/22 DDD\par -xray c-spine 12/22/21 DDD, straightening of normal cervical lordosis \par \par LBP: intermittent; not much pain today \par -xray L-spine 4/6/23 fusion uncomplicated\par -xray SI 4/6/23 Normal\par -reviewed MRI L-spine 1/30/13 w/ DDD, disc herniation \par -states sh had spinal fusion surgery done in past \par -Advil PRN w/ food sparingly if needed \par \par  Depression/Anxiety: denies any SI or HI.\par -on bupropion w/ PCP\par \par -educated on symptoms to monitor for in detail and alert us if any concerns.\par -knows to stay up to date on health maintenance w/ PCP\par -f/u in 2-3 mo w/ labs please or sooner as needed \par . \par \par

## 2023-06-18 ENCOUNTER — NON-APPOINTMENT (OUTPATIENT)
Age: 53
End: 2023-06-18

## 2023-06-19 ENCOUNTER — TRANSCRIPTION ENCOUNTER (OUTPATIENT)
Age: 53
End: 2023-06-19

## 2023-06-20 ENCOUNTER — APPOINTMENT (OUTPATIENT)
Dept: INTERNAL MEDICINE | Facility: CLINIC | Age: 53
End: 2023-06-20
Payer: MEDICAID

## 2023-06-20 VITALS
DIASTOLIC BLOOD PRESSURE: 70 MMHG | RESPIRATION RATE: 16 BRPM | HEART RATE: 68 BPM | SYSTOLIC BLOOD PRESSURE: 108 MMHG | TEMPERATURE: 97.9 F | OXYGEN SATURATION: 99 %

## 2023-06-20 DIAGNOSIS — R79.0 ABNORMAL LVL OF BLOOD MINERAL: ICD-10-CM

## 2023-06-20 PROCEDURE — 99214 OFFICE O/P EST MOD 30 MIN: CPT

## 2023-06-20 NOTE — HISTORY OF PRESENT ILLNESS
[FreeTextEntry1] : Dyspnea on exertion\par Rheumatoid arthritis [de-identified] : Ms. Dumont presents for follow-up evaluation.  She has a history of rheumatoid arthritis.  She is followed for rheumatology with Dr. Lana Berman.  The patient has been recommended to have pulmonary evaluation with pulmonary function tests and CT imaging if needed.  She has had a cardiology follow-up with Dr. Hernandez.  Ms. Dumont does get some shortness of breath with exertion, however, symptoms resolved at rest.  She relates this to poor cardiovascular conditioning.  She has no nocturnal symptoms of cough or dyspnea.

## 2023-06-20 NOTE — PLAN
[FreeTextEntry1] : Ms. Dumont presents for follow-up evaluation.\par \par 1.  She has a history of rheumatoid arthritis.  Patient will be scheduled for complete pulmonary function test.  She does have some dyspnea on exertion.  She had a cardiology evaluation by Dr. Hernandez Including an echocardiogram on 6/9/2023 which did not show evidence of pulmonary hypertension.\par \par 2.  Patient has a previous history of elevated iron level.  She will repeat iron studies.  If iron saturation remains high she will be tested for hemochromatosis and referred to gastroenterology.\par \par 3.  Patient does have hyperlipidemia.  10-year risk for atherosclerotic cardiovascular disease is 1.8%.  Lipid profile will be repeated.\par \par 4.  Follow-up in September as scheduled.

## 2023-06-21 ENCOUNTER — APPOINTMENT (OUTPATIENT)
Dept: INTERNAL MEDICINE | Facility: CLINIC | Age: 53
End: 2023-06-21
Payer: MEDICAID

## 2023-06-21 ENCOUNTER — RX RENEWAL (OUTPATIENT)
Age: 53
End: 2023-06-21

## 2023-06-21 VITALS
OXYGEN SATURATION: 99 % | BODY MASS INDEX: 26.98 KG/M2 | HEIGHT: 64.5 IN | HEART RATE: 64 BPM | DIASTOLIC BLOOD PRESSURE: 74 MMHG | TEMPERATURE: 97.5 F | WEIGHT: 160 LBS | RESPIRATION RATE: 16 BRPM | SYSTOLIC BLOOD PRESSURE: 109 MMHG

## 2023-06-21 DIAGNOSIS — R06.09 OTHER FORMS OF DYSPNEA: ICD-10-CM

## 2023-06-21 DIAGNOSIS — I27.20 PULMONARY HYPERTENSION, UNSPECIFIED: ICD-10-CM

## 2023-06-21 PROCEDURE — 94729 DIFFUSING CAPACITY: CPT

## 2023-06-21 PROCEDURE — 94060 EVALUATION OF WHEEZING: CPT

## 2023-06-21 PROCEDURE — 94727 GAS DIL/WSHOT DETER LNG VOL: CPT

## 2023-06-28 LAB
CA VI IGA AB: 7.9 EU/ML
CA VI IGG AB: 4.4 EU/ML
CA VI IGM AB: 7.6 EU/ML
PSP IGA AB: 1.3 EU/ML
PSP IGG AB: 5.9 EU/ML
PSP IGM AB: 3.3 EU/ML
SEROLOGY COMMENTS: NORMAL
SP-1 IGA AB: 1.4 EU/ML
SP-1 IGG AB: 12 EU/ML
SP-1 IGM AB: 7 EU/ML

## 2023-06-29 PROBLEM — R06.09 DYSPNEA ON EXERTION: Status: ACTIVE | Noted: 2023-06-20

## 2023-06-29 PROBLEM — I27.20 PULMONARY HYPERTENSION: Status: ACTIVE | Noted: 2023-05-02

## 2023-09-19 ENCOUNTER — NON-APPOINTMENT (OUTPATIENT)
Age: 53
End: 2023-09-19

## 2023-09-20 ENCOUNTER — APPOINTMENT (OUTPATIENT)
Dept: RHEUMATOLOGY | Facility: CLINIC | Age: 53
End: 2023-09-20
Payer: COMMERCIAL

## 2023-09-20 VITALS
HEART RATE: 69 BPM | SYSTOLIC BLOOD PRESSURE: 111 MMHG | DIASTOLIC BLOOD PRESSURE: 74 MMHG | OXYGEN SATURATION: 97 % | BODY MASS INDEX: 27.66 KG/M2 | HEIGHT: 64.5 IN | TEMPERATURE: 97.1 F | WEIGHT: 164 LBS

## 2023-09-20 DIAGNOSIS — R68.2 DRY MOUTH, UNSPECIFIED: ICD-10-CM

## 2023-09-20 DIAGNOSIS — R70.0 ELEVATED ERYTHROCYTE SEDIMENTATION RATE: ICD-10-CM

## 2023-09-20 DIAGNOSIS — M70.62 TROCHANTERIC BURSITIS, LEFT HIP: ICD-10-CM

## 2023-09-20 DIAGNOSIS — M70.61 TROCHANTERIC BURSITIS, RIGHT HIP: ICD-10-CM

## 2023-09-20 PROCEDURE — 20610 DRAIN/INJ JOINT/BURSA W/O US: CPT | Mod: 50

## 2023-09-20 PROCEDURE — 99214 OFFICE O/P EST MOD 30 MIN: CPT | Mod: 25

## 2023-09-20 RX ORDER — TRIAMCINOLONE ACETONIDE 40 MG/ML
40 SUSPENSION INTRA-ARTERIAL; INTRAMUSCULAR
Qty: 1 | Refills: 0 | Status: COMPLETED | OUTPATIENT
Start: 2023-09-20

## 2023-09-20 RX ADMIN — TRIAMCINOLONE ACETONIDE 40 MG/ML: 40 INJECTION, SUSPENSION INTRA-ARTICULAR; INTRAMUSCULAR at 00:00

## 2023-09-29 ENCOUNTER — APPOINTMENT (OUTPATIENT)
Dept: INTERNAL MEDICINE | Facility: CLINIC | Age: 53
End: 2023-09-29
Payer: COMMERCIAL

## 2023-09-29 VITALS
HEART RATE: 72 BPM | WEIGHT: 161 LBS | RESPIRATION RATE: 16 BRPM | HEIGHT: 64.5 IN | DIASTOLIC BLOOD PRESSURE: 60 MMHG | OXYGEN SATURATION: 98 % | BODY MASS INDEX: 27.15 KG/M2 | SYSTOLIC BLOOD PRESSURE: 100 MMHG | TEMPERATURE: 98.1 F

## 2023-09-29 DIAGNOSIS — M25.559 PAIN IN UNSPECIFIED HIP: ICD-10-CM

## 2023-09-29 DIAGNOSIS — E78.00 PURE HYPERCHOLESTEROLEMIA, UNSPECIFIED: ICD-10-CM

## 2023-09-29 DIAGNOSIS — I73.00 RAYNAUD'S SYNDROME W/OUT GANGRENE: ICD-10-CM

## 2023-09-29 DIAGNOSIS — R20.2 PARESTHESIA OF SKIN: ICD-10-CM

## 2023-09-29 DIAGNOSIS — Z12.11 ENCOUNTER FOR SCREENING FOR MALIGNANT NEOPLASM OF COLON: ICD-10-CM

## 2023-09-29 DIAGNOSIS — Z87.39 PERSONAL HISTORY OF OTHER DISEASES OF THE MUSCULOSKELETAL SYSTEM AND CONNECTIVE TISSUE: ICD-10-CM

## 2023-09-29 DIAGNOSIS — Z87.19 PERSONAL HISTORY OF OTHER DISEASES OF THE DIGESTIVE SYSTEM: ICD-10-CM

## 2023-09-29 DIAGNOSIS — M25.579 PAIN IN UNSPECIFIED ANKLE AND JOINTS OF UNSPECIFIED FOOT: ICD-10-CM

## 2023-09-29 DIAGNOSIS — Z87.898 PERSONAL HISTORY OF OTHER SPECIFIED CONDITIONS: ICD-10-CM

## 2023-09-29 DIAGNOSIS — Z23 ENCOUNTER FOR IMMUNIZATION: ICD-10-CM

## 2023-09-29 DIAGNOSIS — M79.674 PAIN IN RIGHT TOE(S): ICD-10-CM

## 2023-09-29 DIAGNOSIS — M79.675 PAIN IN RIGHT TOE(S): ICD-10-CM

## 2023-09-29 DIAGNOSIS — E83.19 OTHER DISORDERS OF IRON METABOLISM: ICD-10-CM

## 2023-09-29 PROCEDURE — 99214 OFFICE O/P EST MOD 30 MIN: CPT | Mod: 25

## 2023-09-29 PROCEDURE — G0008: CPT

## 2023-09-29 PROCEDURE — 90686 IIV4 VACC NO PRSV 0.5 ML IM: CPT

## 2023-09-29 RX ORDER — HYOSCYAMINE SULFATE 0.12 MG/1
0.12 TABLET ORAL
Qty: 120 | Refills: 0 | Status: DISCONTINUED | COMMUNITY
Start: 2023-05-11 | End: 2023-09-29

## 2023-10-18 ENCOUNTER — RX RENEWAL (OUTPATIENT)
Age: 53
End: 2023-10-18

## 2023-10-29 ENCOUNTER — RX RENEWAL (OUTPATIENT)
Age: 53
End: 2023-10-29

## 2023-11-06 ENCOUNTER — APPOINTMENT (OUTPATIENT)
Dept: ORTHOPEDIC SURGERY | Facility: CLINIC | Age: 53
End: 2023-11-06
Payer: COMMERCIAL

## 2023-11-06 DIAGNOSIS — M51.36 OTHER INTERVERTEBRAL DISC DEGENERATION, LUMBAR REGION: ICD-10-CM

## 2023-11-06 PROCEDURE — 72100 X-RAY EXAM L-S SPINE 2/3 VWS: CPT

## 2023-11-06 PROCEDURE — 99214 OFFICE O/P EST MOD 30 MIN: CPT | Mod: 25

## 2023-11-13 ENCOUNTER — TRANSCRIPTION ENCOUNTER (OUTPATIENT)
Age: 53
End: 2023-11-13

## 2023-11-22 ENCOUNTER — APPOINTMENT (OUTPATIENT)
Dept: DERMATOLOGY | Facility: CLINIC | Age: 53
End: 2023-11-22
Payer: COMMERCIAL

## 2023-11-22 VITALS — BODY MASS INDEX: 27.49 KG/M2 | WEIGHT: 165 LBS | HEIGHT: 65 IN

## 2023-11-22 DIAGNOSIS — D22.5 MELANOCYTIC NEVI OF TRUNK: ICD-10-CM

## 2023-11-22 DIAGNOSIS — L81.4 OTHER MELANIN HYPERPIGMENTATION: ICD-10-CM

## 2023-11-22 DIAGNOSIS — D23.9 OTHER BENIGN NEOPLASM OF SKIN, UNSPECIFIED: ICD-10-CM

## 2023-11-22 PROCEDURE — 99214 OFFICE O/P EST MOD 30 MIN: CPT

## 2023-11-26 LAB
HCT VFR BLD CALC: 40.2 %
HGB BLD-MCNC: 13.4 G/DL
IRON SATN MFR SERPL: 41 %
IRON SERPL-MCNC: 149 UG/DL
MCHC RBC-ENTMCNC: 32.8 PG
MCHC RBC-ENTMCNC: 33.3 GM/DL
MCV RBC AUTO: 98.5 FL
PLATELET # BLD AUTO: 326 K/UL
RBC # BLD: 4.08 M/UL
RBC # FLD: 11.9 %
TIBC SERPL-MCNC: 363 UG/DL
UIBC SERPL-MCNC: 215 UG/DL
WBC # FLD AUTO: 8.41 K/UL

## 2023-11-27 LAB
ALBUMIN SERPL ELPH-MCNC: 4.2 G/DL
ALP BLD-CCNC: 55 U/L
ALT SERPL-CCNC: 16 U/L
ANA PAT FLD IF-IMP: ABNORMAL
ANA SER IF-ACNC: ABNORMAL
ANION GAP SERPL CALC-SCNC: 10 MMOL/L
AST SERPL-CCNC: 16 U/L
BASOPHILS # BLD AUTO: 0.07 K/UL
BASOPHILS NFR BLD AUTO: 0.8 %
BILIRUB SERPL-MCNC: 0.4 MG/DL
BUN SERPL-MCNC: 14 MG/DL
C3 SERPL-MCNC: 149 MG/DL
C4 SERPL-MCNC: 48 MG/DL
CALCIUM SERPL-MCNC: 9.3 MG/DL
CHLORIDE SERPL-SCNC: 98 MMOL/L
CO2 SERPL-SCNC: 24 MMOL/L
CREAT SERPL-MCNC: 0.95 MG/DL
CREAT SPEC-SCNC: 89 MG/DL
CREAT/PROT UR: 0.1 RATIO
CRP SERPL-MCNC: 8 MG/L
DSDNA AB SER-ACNC: <12 IU/ML
EGFR: 71 ML/MIN/1.73M2
EOSINOPHIL # BLD AUTO: 0.05 K/UL
EOSINOPHIL NFR BLD AUTO: 0.6 %
ERYTHROCYTE [SEDIMENTATION RATE] IN BLOOD BY WESTERGREN METHOD: 28 MM/HR
GLUCOSE SERPL-MCNC: 84 MG/DL
HCT VFR BLD CALC: 40.4 %
HGB BLD-MCNC: 13.6 G/DL
IMM GRANULOCYTES NFR BLD AUTO: 0.2 %
LYMPHOCYTES # BLD AUTO: 1.59 K/UL
LYMPHOCYTES NFR BLD AUTO: 19.1 %
MAN DIFF?: NORMAL
MCHC RBC-ENTMCNC: 33.3 PG
MCHC RBC-ENTMCNC: 33.7 GM/DL
MCV RBC AUTO: 98.8 FL
MONOCYTES # BLD AUTO: 0.59 K/UL
MONOCYTES NFR BLD AUTO: 7.1 %
NEUTROPHILS # BLD AUTO: 6.02 K/UL
NEUTROPHILS NFR BLD AUTO: 72.2 %
PLATELET # BLD AUTO: 336 K/UL
POTASSIUM SERPL-SCNC: 4.5 MMOL/L
PROT SERPL-MCNC: 6.7 G/DL
PROT UR-MCNC: 5 MG/DL
RBC # BLD: 4.09 M/UL
RBC # FLD: 11.9 %
SODIUM SERPL-SCNC: 133 MMOL/L
WBC # FLD AUTO: 8.34 K/UL

## 2023-11-28 ENCOUNTER — TRANSCRIPTION ENCOUNTER (OUTPATIENT)
Age: 53
End: 2023-11-28

## 2023-11-30 LAB — TM INTERPRETATION: NORMAL

## 2023-12-08 ENCOUNTER — RX RENEWAL (OUTPATIENT)
Age: 53
End: 2023-12-08

## 2023-12-21 ENCOUNTER — RX RENEWAL (OUTPATIENT)
Age: 53
End: 2023-12-21

## 2023-12-27 ENCOUNTER — RX RENEWAL (OUTPATIENT)
Age: 53
End: 2023-12-27

## 2023-12-27 ENCOUNTER — APPOINTMENT (OUTPATIENT)
Dept: RHEUMATOLOGY | Facility: CLINIC | Age: 53
End: 2023-12-27
Payer: COMMERCIAL

## 2023-12-27 VITALS
TEMPERATURE: 97.3 F | WEIGHT: 165 LBS | HEART RATE: 64 BPM | HEIGHT: 65 IN | DIASTOLIC BLOOD PRESSURE: 71 MMHG | SYSTOLIC BLOOD PRESSURE: 105 MMHG | OXYGEN SATURATION: 99 % | BODY MASS INDEX: 27.49 KG/M2

## 2023-12-27 DIAGNOSIS — R79.82 ELEVATED C-REACTIVE PROTEIN (CRP): ICD-10-CM

## 2023-12-27 PROCEDURE — 99214 OFFICE O/P EST MOD 30 MIN: CPT

## 2023-12-27 RX ORDER — PREDNISONE 10 MG/1
10 TABLET ORAL
Qty: 30 | Refills: 0 | Status: DISCONTINUED | COMMUNITY
Start: 2023-11-06 | End: 2023-12-27

## 2023-12-27 NOTE — HISTORY OF PRESENT ILLNESS
[FreeTextEntry1] : 53-year-old female, here for the first follow up reports FH of depression/anxiety, and celiac in her sister; w/ PMH of depression/anxiety in patient, with OA hands w/ +RF=37; high ESR; +IVIS >1:2560 Speckled; +RNP=3.9 (from 3.3); with reports of joint pain in b/l hands/MCPs/PIPs w/ morning stiffness all day, w/ pain in Rt>lt ankles, pain in hips, and sicca symptoms concerning for RA.  Today she states she is taking  mg total that she is tolerating well at this time. States she saw OpthoDr. Ankur 2023 with eye exam normal on HCQ and told to f/u in 1 yr. Denies any swelling or redness or warmth of any joints on med now. Has good ROM in b/l shoulders, no pelvic/girdle stiffness and able to stand up without using her hands, no temporal pain/unremitting headaches, no vision changes, no jaw pain. States she notices intermittent neck pain with rotation without paresthesias at this time; not much neck pain today. States she notices intermittent lower back pain worse with laying; better with stretching; not much pain today. Denies any loss of bowel incontinence or saddle anesthesias. States she has had spinal fusion done in the past. Denies any fever/chills, no rashes, no ulcers, +dry eyes, + dry mouth, + raynaud's symptoms in the hands & feet turning white in the cold, no infectious diarrhea or  symptoms at this time. Denies any SOB or cough at this time. Denies any history of blood clot, no stroke, 1 , no miscarriages.  States her depression is controlled on the antidepressant and denies any suicidal ideation or homicidal ideation at this time.  States she is not postmenopausal yet so we will defer DEXA at this time.

## 2023-12-27 NOTE — REASON FOR VISIT
[Follow-Up: _____] : a [unfilled] follow-up visit [FreeTextEntry1] : RA; high ESR/CRP; +RNP; +IVIS; review labs/med

## 2023-12-27 NOTE — ASSESSMENT
[FreeTextEntry1] : 53-year-old female, here for the first follow up reports FH of depression/anxiety, and celiac in her sister; w/ PMH of depression/anxiety in patient, with OA hands w/ +RF=37; high ESR; +IVIS >1:2560 Speckled; +RNP=3.9 (from 3.3); with reports of joint pain in b/l hands/MCPs/PIPs w/ morning stiffness all day, w/ pain in Rt>lt ankles, pain in hips, and sicca symptoms concerning for RA.  Seropositive RA: stable today without synovitis -labs as below requested to monitor -reviewed labs 11/24/23 w/ high ESR=28 (from 25); high CRP=8 (from 5);  +IVIS 1: 320 speckled; DS-DNA neg; C3 normal; high C4 (not low); urine prot/creat ratio=0.1; CBC ok; CMP w/ low Zx=783 (told to monitor w/ PCP please); 6/9/23 +RNP=3.9 (from 3.3); +IVIS 1:2560 speckled; 3/31/23 w/ repeat +RF=36; high ESR=38; Vectra=20 low disease activity; +RNP=3.3; +IVIS 1:1280 Speckled; with other serologies stable at this time; 3/22/23 +RF=37; raised ESR=29; high CRP=5; +IVIS >1:2560 Speckled -discussed r/b/s of refilling Plaquenil 300 mg PO total, closer to wt based, w/ G6PD normal with pt agreeable and prescription sent as below -she saw Optho, Dr. Ankur Menjivar 4/14/2023 with eye exam normal on HCQ and told to f/u in 1 yr -dry eyes: artificial tears PRN & states not noted w/ Optho; RO/LA negative; Early Sjogren ab Negative; can be secondary symptom of RA; knows gold standard for Sjogren is lip biopsy and defers it -dry mouth: discussed biotene lozenges prescrbied to help -xray b/l hands 4/6/23 Normal -xray b/l feet 4/6/23 w/ Rt hallux valgus deformity with bunion, OA MTP -xray b/l hips 4/6/23 Normal  Raised ESR/CRP: -denies any signs of infection; no synovitis; denies any wt loss or night sweats and knows to stay up to date on health maintenance w/ PCP please  -will monitor on repeat  +IVIS: +IVIS >1:2560 Speckled -Clinically reports of sicca symptoms and raynaud's that can be seen w/ Speckled pattern without much symptoms of lupus at this time and educated on symptoms to monitor for in detail if she evolves. -Raynaud's: no ulcers and reports of color changes to white in the hands & feet w/ the cold. Discussed to keep hands warm and if not controlled can add Ca channel blocker if BP allows -reviewed labs 11/24/23 w/ +IVIS 1:320 speckled; DS-DNA neg; C3 normal; high C4 (not low); urine prot/creat ratio=0.1 -lab as below w/ disease activity markers to monitor intermittently -has +TPO thyroid abs as discussed +IVIS can be seen with cross reactivity  +RNP: likely being seen in the setting of RA -saw PulmDr. Amaya 6/20/23 with PFTs normal -states Echo w/ cardiology, Dr. Hernandez 5/2/23 was ok without pulmonary hypertension w/ +RNP  Cervicalgia: intermittent; not much pain today -MRI c-spine 4/6/23 No pannus; spondylosis/DDD -MRI c-spine [facility] 2/28/22 DDD -xray c-spine 12/22/21 DDD, straightening of normal cervical lordosis  LBP: intermittent; not much pain today -xray L-spine 4/6/23 fusion uncomplicated -xray SI 4/6/23 Normal -reviewed MRI L-spine 1/30/13 w/ DDD, disc herniation -states sh had spinal fusion surgery done in past -Advil PRN w/ food sparingly if needed   Depression/Anxiety: denies any SI or HI. -on bupropion w/ PCP  -educated on symptoms to monitor for in detail and alert us if any concerns. -knows to stay up to date on health maintenance w/ PCP -f/u in 10- 12 weeks w/ labs please or sooner as needed.

## 2024-01-10 ENCOUNTER — RX RENEWAL (OUTPATIENT)
Age: 54
End: 2024-01-10

## 2024-01-11 ENCOUNTER — RX RENEWAL (OUTPATIENT)
Age: 54
End: 2024-01-11

## 2024-01-11 RX ORDER — METHOCARBAMOL 750 MG/1
750 TABLET, FILM COATED ORAL
Qty: 30 | Refills: 0 | Status: ACTIVE | COMMUNITY
Start: 2023-11-06 | End: 1900-01-01

## 2024-01-16 ENCOUNTER — OUTPATIENT (OUTPATIENT)
Dept: OUTPATIENT SERVICES | Facility: HOSPITAL | Age: 54
LOS: 1 days | Discharge: HOME | End: 2024-01-16

## 2024-01-16 DIAGNOSIS — C91.01 ACUTE LYMPHOBLASTIC LEUKEMIA, IN REMISSION: ICD-10-CM

## 2024-01-18 ENCOUNTER — APPOINTMENT (OUTPATIENT)
Dept: HEMATOLOGY ONCOLOGY | Facility: CLINIC | Age: 54
End: 2024-01-18

## 2024-02-29 ENCOUNTER — RESULT REVIEW (OUTPATIENT)
Age: 54
End: 2024-02-29

## 2024-02-29 ENCOUNTER — APPOINTMENT (OUTPATIENT)
Dept: HEMATOLOGY ONCOLOGY | Facility: CLINIC | Age: 54
End: 2024-02-29
Payer: COMMERCIAL

## 2024-02-29 VITALS
HEIGHT: 65 IN | HEART RATE: 73 BPM | TEMPERATURE: 97.1 F | WEIGHT: 167 LBS | OXYGEN SATURATION: 100 % | BODY MASS INDEX: 27.82 KG/M2 | SYSTOLIC BLOOD PRESSURE: 112 MMHG | DIASTOLIC BLOOD PRESSURE: 73 MMHG

## 2024-02-29 DIAGNOSIS — E83.110 HEREDITARY HEMOCHROMATOSIS: ICD-10-CM

## 2024-02-29 LAB
BASOPHILS # BLD AUTO: 0.04 K/UL — SIGNIFICANT CHANGE UP (ref 0–0.2)
BASOPHILS NFR BLD AUTO: 0.6 % — SIGNIFICANT CHANGE UP (ref 0–2)
EOSINOPHIL # BLD AUTO: 0.04 K/UL — SIGNIFICANT CHANGE UP (ref 0–0.5)
EOSINOPHIL NFR BLD AUTO: 0.6 % — SIGNIFICANT CHANGE UP (ref 0–6)
HCT VFR BLD CALC: 37.9 % — SIGNIFICANT CHANGE UP (ref 34.5–45)
HGB BLD-MCNC: 13.5 G/DL — SIGNIFICANT CHANGE UP (ref 11.5–15.5)
IMM GRANULOCYTES NFR BLD AUTO: 0.1 % — SIGNIFICANT CHANGE UP (ref 0–0.9)
LYMPHOCYTES # BLD AUTO: 1.8 K/UL — SIGNIFICANT CHANGE UP (ref 1–3.3)
LYMPHOCYTES # BLD AUTO: 26.2 % — SIGNIFICANT CHANGE UP (ref 13–44)
MCHC RBC-ENTMCNC: 32.8 PG — SIGNIFICANT CHANGE UP (ref 27–34)
MCHC RBC-ENTMCNC: 35.6 GM/DL — SIGNIFICANT CHANGE UP (ref 32–36)
MCV RBC AUTO: 92 FL — SIGNIFICANT CHANGE UP (ref 80–100)
MONOCYTES # BLD AUTO: 0.62 K/UL — SIGNIFICANT CHANGE UP (ref 0–0.9)
MONOCYTES NFR BLD AUTO: 9 % — SIGNIFICANT CHANGE UP (ref 2–14)
NEUTROPHILS # BLD AUTO: 4.37 K/UL — SIGNIFICANT CHANGE UP (ref 1.8–7.4)
NEUTROPHILS NFR BLD AUTO: 63.5 % — SIGNIFICANT CHANGE UP (ref 43–77)
NRBC # BLD: 0 /100 WBCS — SIGNIFICANT CHANGE UP (ref 0–0)
PLATELET # BLD AUTO: 312 K/UL — SIGNIFICANT CHANGE UP (ref 150–400)
RBC # BLD: 4.12 M/UL — SIGNIFICANT CHANGE UP (ref 3.8–5.2)
RBC # FLD: 11.3 % — SIGNIFICANT CHANGE UP (ref 10.3–14.5)
WBC # BLD: 6.88 K/UL — SIGNIFICANT CHANGE UP (ref 3.8–10.5)
WBC # FLD AUTO: 6.88 K/UL — SIGNIFICANT CHANGE UP (ref 3.8–10.5)

## 2024-02-29 PROCEDURE — 99205 OFFICE O/P NEW HI 60 MIN: CPT

## 2024-03-01 LAB
ALBUMIN SERPL ELPH-MCNC: 4.2 G/DL — SIGNIFICANT CHANGE UP (ref 3.3–5)
ALP SERPL-CCNC: 59 U/L — SIGNIFICANT CHANGE UP (ref 40–120)
ALT FLD-CCNC: 23 U/L — SIGNIFICANT CHANGE UP (ref 10–45)
ANION GAP SERPL CALC-SCNC: 11 MMOL/L — SIGNIFICANT CHANGE UP (ref 5–17)
AST SERPL-CCNC: 24 U/L — SIGNIFICANT CHANGE UP (ref 10–40)
BILIRUB SERPL-MCNC: 0.4 MG/DL — SIGNIFICANT CHANGE UP (ref 0.2–1.2)
BUN SERPL-MCNC: 12 MG/DL — SIGNIFICANT CHANGE UP (ref 7–23)
CALCIUM SERPL-MCNC: 9.3 MG/DL — SIGNIFICANT CHANGE UP (ref 8.4–10.5)
CHLORIDE SERPL-SCNC: 97 MMOL/L — SIGNIFICANT CHANGE UP (ref 96–108)
CO2 SERPL-SCNC: 24 MMOL/L — SIGNIFICANT CHANGE UP (ref 22–31)
CREAT SERPL-MCNC: 0.87 MG/DL — SIGNIFICANT CHANGE UP (ref 0.5–1.3)
EGFR: 79 ML/MIN/1.73M2 — SIGNIFICANT CHANGE UP
FERRITIN SERPL-MCNC: 101 NG/ML — SIGNIFICANT CHANGE UP (ref 13–330)
GLUCOSE SERPL-MCNC: 108 MG/DL — HIGH (ref 70–99)
IRON SATN MFR SERPL: 165 UG/DL — HIGH (ref 30–160)
IRON SATN MFR SERPL: 48 % — SIGNIFICANT CHANGE UP (ref 14–50)
POTASSIUM SERPL-MCNC: 4.1 MMOL/L — SIGNIFICANT CHANGE UP (ref 3.5–5.3)
POTASSIUM SERPL-SCNC: 4.1 MMOL/L — SIGNIFICANT CHANGE UP (ref 3.5–5.3)
PROT SERPL-MCNC: 6.7 G/DL — SIGNIFICANT CHANGE UP (ref 6–8.3)
SODIUM SERPL-SCNC: 132 MMOL/L — LOW (ref 135–145)
TIBC SERPL-MCNC: 343 UG/DL — SIGNIFICANT CHANGE UP (ref 220–430)
UIBC SERPL-MCNC: 178 UG/DL — SIGNIFICANT CHANGE UP (ref 110–370)

## 2024-03-05 ENCOUNTER — RX RENEWAL (OUTPATIENT)
Age: 54
End: 2024-03-05

## 2024-03-10 PROBLEM — E83.110 HEREDITARY HEMOCHROMATOSIS: Status: ACTIVE | Noted: 2024-02-28

## 2024-03-10 NOTE — ASSESSMENT
[FreeTextEntry1] : This is a 53 year old female who is here for an evaluation of hereditary hemochromatosis.   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  No evidence of iron overload.   Discussed that patients with compound heterozygous C282Y/H63D  - Significantly less likely to develop iron overload in comparison to homozygous C282Y/C282Y, although the absolute risk is challenging to characterize. With her ferritin being less than 100, she very unlikely to have an iron overload.   But as she is still menstruating (although not with singificant blood loss) would still periodically monitor her to ensure she does not develop any overload.  Explained that patients with heterozygous or compound mutations have a risk of developing overload if they developed organ injury from contributing comorbidities such as excess alcohol or viral hepatitis, fatty liver.    Yamilethw now will repeat her iron studies to confirm no overload.  No need for any intervention at this time.   Moderate alcohol intake.  She will follow up annually to periodically monitor her iron profile.

## 2024-03-10 NOTE — REVIEW OF SYSTEMS
[Diarrhea: Grade 0] : Diarrhea: Grade 0 [Joint Pain] : joint pain [Joint Stiffness] : joint stiffness [Negative] : Heme/Lymph

## 2024-03-10 NOTE — HISTORY OF PRESENT ILLNESS
[de-identified] : This is a 53 year old female who is referred by Dr. John Amaya for an evaluation of hemochromatosis.   She was found on labs completed on 11/24/2023- compound mutation of the hemochromatosis C282Y/H63D gene.   She denies any history of iron overload, no family history of iron overload in her parents.  She has no children.   Ferritin 77- 8/18/2022  She has a history of RA, followed by Dr. Berman for which she is on Plaquenil.   She is on OCP.   Denies any recent infections.  She has a history of alcoholism in the past.   Told she is unable to donate blood due to a family history of CJ disease and because she lived in [place] during mad cow disease.

## 2024-03-10 NOTE — CONSULT LETTER
[Dear  ___] : Dear  [unfilled], [Consult Letter:] : I had the pleasure of evaluating your patient, [unfilled]. [Consult Closing:] : Thank you very much for allowing me to participate in the care of this patient.  If you have any questions, please do not hesitate to contact me. [Please see my note below.] : Please see my note below. [Sincerely,] : Sincerely, [FreeTextEntry3] : Lana Walter D.O.  of Medicine Hematology/Oncology [facility]

## 2024-03-12 DIAGNOSIS — E83.110 HEREDITARY HEMOCHROMATOSIS: ICD-10-CM

## 2024-03-18 ENCOUNTER — APPOINTMENT (OUTPATIENT)
Dept: RHEUMATOLOGY | Facility: CLINIC | Age: 54
End: 2024-03-18
Payer: COMMERCIAL

## 2024-03-18 VITALS
DIASTOLIC BLOOD PRESSURE: 78 MMHG | BODY MASS INDEX: 27.99 KG/M2 | WEIGHT: 168 LBS | HEART RATE: 60 BPM | SYSTOLIC BLOOD PRESSURE: 116 MMHG | OXYGEN SATURATION: 99 % | TEMPERATURE: 97 F | HEIGHT: 65 IN

## 2024-03-18 LAB
ALBUMIN SERPL ELPH-MCNC: 4.2 G/DL
ALP BLD-CCNC: 54 U/L
ALT SERPL-CCNC: 15 U/L
ANION GAP SERPL CALC-SCNC: 16 MMOL/L
AST SERPL-CCNC: 21 U/L
BASOPHILS # BLD AUTO: 0.07 K/UL
BASOPHILS NFR BLD AUTO: 0.9 %
BILIRUB SERPL-MCNC: 0.3 MG/DL
BUN SERPL-MCNC: 14 MG/DL
CALCIUM SERPL-MCNC: 9.4 MG/DL
CHLORIDE SERPL-SCNC: 95 MMOL/L
CO2 SERPL-SCNC: 21 MMOL/L
CREAT SERPL-MCNC: 0.92 MG/DL
CRP SERPL-MCNC: 4 MG/L
EGFR: 74 ML/MIN/1.73M2
EOSINOPHIL # BLD AUTO: 0.02 K/UL
EOSINOPHIL NFR BLD AUTO: 0.3 %
ERYTHROCYTE [SEDIMENTATION RATE] IN BLOOD BY WESTERGREN METHOD: 17 MM/HR
GLUCOSE SERPL-MCNC: 68 MG/DL
HAV IGM SER QL: NONREACTIVE
HBV CORE IGM SER QL: NONREACTIVE
HBV SURFACE AG SER QL: NONREACTIVE
HCT VFR BLD CALC: 39.5 %
HCV AB SER QL: NONREACTIVE
HCV S/CO RATIO: 0.11 S/CO
HGB BLD-MCNC: 13.2 G/DL
IMM GRANULOCYTES NFR BLD AUTO: 0.4 %
LYMPHOCYTES # BLD AUTO: 1.42 K/UL
LYMPHOCYTES NFR BLD AUTO: 18.3 %
MAN DIFF?: NORMAL
MCHC RBC-ENTMCNC: 31.7 PG
MCHC RBC-ENTMCNC: 33.4 GM/DL
MCV RBC AUTO: 94.7 FL
MONOCYTES # BLD AUTO: 0.57 K/UL
MONOCYTES NFR BLD AUTO: 7.3 %
NEUTROPHILS # BLD AUTO: 5.65 K/UL
NEUTROPHILS NFR BLD AUTO: 72.8 %
PLATELET # BLD AUTO: 345 K/UL
POTASSIUM SERPL-SCNC: 4.9 MMOL/L
PROT SERPL-MCNC: 6.9 G/DL
RBC # BLD: 4.17 M/UL
RBC # FLD: 11.7 %
SODIUM SERPL-SCNC: 132 MMOL/L
WBC # FLD AUTO: 7.76 K/UL

## 2024-03-18 PROCEDURE — 99214 OFFICE O/P EST MOD 30 MIN: CPT

## 2024-03-18 PROCEDURE — G2211 COMPLEX E/M VISIT ADD ON: CPT

## 2024-03-18 RX ORDER — LUBIPROSTONE 24 UG/1
24 CAPSULE ORAL TWICE DAILY
Qty: 180 | Refills: 2 | Status: DISCONTINUED | COMMUNITY
Start: 2023-06-02 | End: 2024-03-18

## 2024-03-18 NOTE — ASSESSMENT
[FreeTextEntry1] : 53-year-old female, here for the first follow up reports FH of depression/anxiety, and celiac in her sister; w/ PMH of depression/anxiety in patient, with OA hands w/ +RF=37; high ESR; +IVIS >1:2560 Speckled; +RNP=3.9 (from 3.3); with reports of joint pain in b/l hands/MCPs/PIPs w/ morning stiffness all day, w/ pain in Rt>lt ankles, pain in hips, and sicca symptoms concerning for RA.  Seropositive RA: stable today without synovitis -reviewed labs 3/15/24 w/ ESR normal; CRP normal; CBC ok; CMP w/ low Zk=517 (told to monitor w/ PCP please); low glu (asymptomatic, told no fasting needing); hepatitis negative; TPMT enzyme pending; 11/24/23 w/ high ESR=28 (from 25); high CRP=8 (from 5); +IVIS 1: 320 speckled; DS-DNA neg; C3 normal; high C4 (not low); urine prot/creat ratio=0.1; CBC ok; CMP w/ low Rr=858 (told to monitor w/ PCP please); 6/9/23 +RNP=3.9 (from 3.3); +IVIS 1:2560 speckled; 3/31/23 w/ repeat +RF=36; high ESR=38; Vectra=20 low disease activity; +RNP=3.3; +IVIS 1:1280 Speckled; with other serologies stable at this time; 3/22/23 +RF=37; raised ESR=29; high CRP=5; +IVIS >1:2560 Speckled -discussed r/b/s of refilling Plaquenil 300 mg PO total, closer to wt based, w/ G6PD normal with pt agreeable and prescription sent as below -she saw Optho, Dr. Ankur Menjivar 4/14/2023 with eye exam normal on HCQ and told to f/u in 1 yr w/ referral given -dry eyes: artificial tears PRN & states not noted w/ Optho; RO/LA negative; Early Sjogren ab Negative; can be secondary symptom of RA; knows gold standard for Sjogren is lip biopsy and defers it -dry mouth: discussed biotene lozenges prescrbied to help -xray b/l hands 4/6/23 Normal -xray b/l feet 4/6/23 w/ Rt hallux valgus deformity with bunion, OA MTP -xray b/l hips 4/6/23 Normal  +IVIS: +IVIS >1:2560 Speckled -Clinically reports of sicca symptoms and raynaud's that can be seen w/ Speckled pattern without much symptoms of lupus at this time and educated on symptoms to monitor for in detail if she evolves. -Raynaud's: no ulcers and reports of color changes to white in the hands & feet w/ the cold. Discussed to keep hands warm and if not controlled can add Ca channel blocker if BP allows -reviewed labs 11/24/23 w/ +IVIS 1:320 speckled; DS-DNA neg; C3 normal; high C4 (not low); urine prot/creat ratio=0.1 -lab as below w/ disease activity markers to monitor intermittently -has +TPO thyroid abs as discussed +IVIS can be seen with cross reactivity  +RNP: likely being seen in the setting of RA -saw PulmDr. Amaya 6/20/23 with PFTs normal -states Echo w/ cardiology, Dr. Hernandez 5/2/23 was ok without pulmonary hypertension w/ +RNP  Cervicalgia: intermittent; not much pain today -MRI c-spine 4/6/23 No pannus; spondylosis/DDD -MRI c-spine [facility] 2/28/22 DDD -xray c-spine 12/22/21 DDD, straightening of normal cervical lordosis  LBP: intermittent; not much pain today -xray L-spine 4/6/23 fusion uncomplicated -xray SI 4/6/23 Normal -reviewed MRI L-spine 1/30/13 w/ DDD, disc herniation -states she had spinal fusion surgery done in past -Advil PRN w/ food sparingly if needed  Depression/Anxiety: denies any SI or HI. -on bupropion w/ PCP  -educated on symptoms to monitor for in detail and alert us if any concerns. -knows to stay up to date on health maintenance w/ PCP -f/u in 10- 12 weeks w/ labs please or sooner as needed.

## 2024-03-18 NOTE — PHYSICAL EXAM
[General Appearance - Alert] : alert [General Appearance - In No Acute Distress] : in no acute distress [Extraocular Movements] : extraocular movements were intact [Sclera] : the sclera and conjunctiva were normal [Outer Ear] : the ears and nose were normal in appearance [Neck Appearance] : the appearance of the neck was normal [Respiration, Rhythm And Depth] : normal respiratory rhythm and effort [Heart Sounds] : normal S1 and S2 [Heart Rate And Rhythm] : heart rate was normal and rhythm regular [Abdomen Tenderness] : non-tender [Abdomen Soft] : soft [Supraclavicular Lymph Nodes Enlarged Bilaterally] : supraclavicular [Cervical Lymph Nodes Enlarged Anterior Bilaterally] : anterior cervical [No CVA Tenderness] : no ~M costovertebral angle tenderness [Motor Tone] : muscle strength and tone were normal [] : no rash [Impaired Insight] : insight and judgment were intact [No Focal Deficits] : no focal deficits [Mood] : the mood was normal [FreeTextEntry1] : No synovitis or effusion on exam noted today; Good ROM in b/l shoulders, no pelvic/girdle stiffness and able to stand up without using her hands

## 2024-03-18 NOTE — REASON FOR VISIT
[Follow-Up: _____] : a [unfilled] follow-up visit [FreeTextEntry1] : RA; high ESR/CRP; +RNP; +IVIS; review labs/med.

## 2024-03-18 NOTE — HISTORY OF PRESENT ILLNESS
[FreeTextEntry1] : 53-year-old female, here for the first follow up reports FH of depression/anxiety, and celiac in her sister; w/ PMH of depression/anxiety in patient, with OA hands w/ +RF=37; high ESR; +IVIS >1:2560 Speckled; +RNP=3.9 (from 3.3); with reports of joint pain in b/l hands/MCPs/PIPs w/ morning stiffness all day, w/ pain in Rt>lt ankles, pain in hips, and sicca symptoms concerning for RA.  Today she states she is taking  mg total that she is tolerating well at this time. States she saw OptDr. Ankur hollingsworth 2023 with eye exam normal on HCQ and told to f/u in 1 yr & would like referral. Denies any swelling or redness or warmth of any joints on med now. Has good ROM in b/l shoulders, no pelvic/girdle stiffness and able to stand up without using her hands, no temporal pain/unremitting headaches, no vision changes, no jaw pain. States she notices intermittent neck pain with rotation without paresthesias at this time; not much neck pain today. States she notices intermittent lower back pain worse with laying; better with stretching; not much pain today. Denies any loss of bowel incontinence or saddle anesthesias. States she has had spinal fusion done in the past. Denies any fever/chills, no rashes, no ulcers, +dry eyes, + dry mouth, + raynaud's symptoms in the hands & feet turning white in the cold, no infectious diarrhea or  symptoms at this time. Denies any SOB or cough at this time. Denies any history of blood clot, no stroke, 1 , no miscarriages.  States her depression is controlled on the antidepressant and denies any suicidal ideation or homicidal ideation at this time.  States she is not postmenopausal yet so we will defer DEXA at this time.

## 2024-03-20 LAB
TPMT ENZYME INTERPRETATION: NORMAL
TPMT ENZYME METHODOLOGY: NORMAL
TPMT ENZYME: 19.6

## 2024-04-25 ENCOUNTER — RX RENEWAL (OUTPATIENT)
Age: 54
End: 2024-04-25

## 2024-04-27 NOTE — PHYSICAL EXAM
Wt Readings from Last 3 Encounters:   04/27/24 66.6 kg (146 lb 13.2 oz)   12/26/23 69.1 kg (152 lb 6.4 oz)   11/03/23 72.4 kg (159 lb 9.8 oz)     Appears euvolemic  Takes torsemide 100 mg daily and metolazone once a week  Will hold in setting of sepsis, and mildly worsening creatinine  Resume diuretics as appropriate  Caution with IV fluids         [General Appearance - Alert] : alert [General Appearance - In No Acute Distress] : in no acute distress [Sclera] : the sclera and conjunctiva were normal [Extraocular Movements] : extraocular movements were intact [Outer Ear] : the ears and nose were normal in appearance [Neck Appearance] : the appearance of the neck was normal [Respiration, Rhythm And Depth] : normal respiratory rhythm and effort [Heart Rate And Rhythm] : heart rate was normal and rhythm regular [Heart Sounds] : normal S1 and S2 [Abdomen Soft] : soft [Abdomen Tenderness] : non-tender [Cervical Lymph Nodes Enlarged Anterior Bilaterally] : anterior cervical [Supraclavicular Lymph Nodes Enlarged Bilaterally] : supraclavicular [No CVA Tenderness] : no ~M costovertebral angle tenderness [Motor Tone] : muscle strength and tone were normal [] : no rash [No Focal Deficits] : no focal deficits [Impaired Insight] : insight and judgment were intact [Mood] : the mood was normal [FreeTextEntry1] : No synovitis or effusion on exam noted today; Good ROM in b/l shoulders, no pelvic/girdle stiffness and able to stand up without using her hands; +Rt trochanteric bursa tenderness; + multiple tender points incld sternum; upper/lower back;elbows;hips; medial knee,etc

## 2024-05-09 ENCOUNTER — NON-APPOINTMENT (OUTPATIENT)
Age: 54
End: 2024-05-09

## 2024-05-14 ENCOUNTER — RX RENEWAL (OUTPATIENT)
Age: 54
End: 2024-05-14

## 2024-05-14 RX ORDER — MELOXICAM 15 MG/1
15 TABLET ORAL
Qty: 30 | Refills: 1 | Status: ACTIVE | COMMUNITY
Start: 2024-05-10 | End: 1900-01-01

## 2024-05-21 ENCOUNTER — RX RENEWAL (OUTPATIENT)
Age: 54
End: 2024-05-21

## 2024-05-21 RX ORDER — BUPROPION HYDROCHLORIDE 150 MG/1
150 TABLET, EXTENDED RELEASE ORAL
Qty: 30 | Refills: 5 | Status: ACTIVE | COMMUNITY
Start: 2022-04-07 | End: 1900-01-01

## 2024-06-13 ENCOUNTER — APPOINTMENT (OUTPATIENT)
Dept: RHEUMATOLOGY | Facility: CLINIC | Age: 54
End: 2024-06-13
Payer: COMMERCIAL

## 2024-06-13 VITALS
OXYGEN SATURATION: 99 % | SYSTOLIC BLOOD PRESSURE: 116 MMHG | BODY MASS INDEX: 27.66 KG/M2 | WEIGHT: 166 LBS | HEART RATE: 73 BPM | DIASTOLIC BLOOD PRESSURE: 72 MMHG | TEMPERATURE: 97.6 F | HEIGHT: 65 IN

## 2024-06-13 DIAGNOSIS — M05.741 RHEUMATOID ARTHRITIS WITH RHEUMATOID FACTOR OF RIGHT HAND W/OUT ORGAN OR SYSTEMS INVOLVEMENT: ICD-10-CM

## 2024-06-13 DIAGNOSIS — M05.742 RHEUMATOID ARTHRITIS WITH RHEUMATOID FACTOR OF RIGHT HAND W/OUT ORGAN OR SYSTEMS INVOLVEMENT: ICD-10-CM

## 2024-06-13 DIAGNOSIS — M35.00 SICCA SYNDROME, UNSPECIFIED: ICD-10-CM

## 2024-06-13 DIAGNOSIS — R76.8 OTHER SPECIFIED ABNORMAL IMMUNOLOGICAL FINDINGS IN SERUM: ICD-10-CM

## 2024-06-13 DIAGNOSIS — I73.00 RAYNAUD'S SYNDROME W/OUT GANGRENE: ICD-10-CM

## 2024-06-13 PROCEDURE — G2211 COMPLEX E/M VISIT ADD ON: CPT

## 2024-06-13 PROCEDURE — 99214 OFFICE O/P EST MOD 30 MIN: CPT

## 2024-06-13 RX ORDER — LINACLOTIDE 290 UG/1
290 CAPSULE, GELATIN COATED ORAL
Refills: 0 | Status: ACTIVE | COMMUNITY

## 2024-06-13 NOTE — REASON FOR VISIT
[Follow-Up: _____] : a [unfilled] follow-up visit [FreeTextEntry1] : RA; +RF: hx of high ESR/CRP; +RNP; +IVIS; labs/med.

## 2024-06-13 NOTE — ASSESSMENT
[FreeTextEntry1] : 53-year-old female, here for the first follow up reports FH of depression/anxiety, and celiac in her sister; w/ PMH of depression/anxiety in patient, with OA hands w/ +RF=37; high ESR; +IVIS >1:2560 Speckled; +RNP=3.9 (from 3.3); with reports of joint pain in b/l hands/MCPs/PIPs w/ morning stiffness all day, w/ pain in Rt>lt ankles, pain in hips, and sicca symptoms concerning for RA.  Seropositive RA: stable today without synovitis -reviewed labs 3/15/24 w/ ESR normal; CRP normal; CBC ok; CMP w/ low Ie=617 (told to monitor w/ PCP please); low glu (asymptomatic, told no fasting needing); hepatitis negative; TPMT enzyme pending; 11/24/23 w/ high ESR=28 (from 25); high CRP=8 (from 5); +IVIS 1: 320 speckled; DS-DNA neg; C3 normal; high C4 (not low); urine prot/creat ratio=0.1; CBC ok; CMP w/ low Gt=073 (told to monitor w/ PCP please); 6/9/23 +RNP=3.9 (from 3.3); +IVIS 1:2560 speckled; 3/31/23 w/ repeat +RF=36; high ESR=38; Vectra=20 low disease activity; +RNP=3.3; +IVIS 1:1280 Speckled; with other serologies stable at this time; 3/22/23 +RF=37; raised ESR=29; high CRP=5; +IVIS >1:2560 Speckled -discussed r/b/s of refilling Plaquenil 300 mg PO total, closer to wt based, w/ G6PD normal with pt agreeable and prescription sent as below -she saw OptDr. Meir hollingsworth 6/2024 with eye exam normal on HCQ and told to monitor there -labs as below requested  -dry eyes: artificial tears PRN & states not noted w/ Optho; RO/LA negative; Early Sjogren ab Negative; can be secondary symptom of RA; knows gold standard for Sjogren is lip biopsy and defers it -dry mouth: discussed biotene lozenges prescrbied to help -xray b/l hands 4/6/23 Normal -xray b/l feet 4/6/23 w/ Rt hallux valgus deformity with bunion, OA MTP -xray b/l hips 4/6/23 Normal  +IVIS: +IVIS >1:2560 Speckled -Clinically reports of sicca symptoms and raynaud's that can be seen w/ Speckled pattern without much symptoms of lupus at this time and educated on symptoms to monitor for in detail if she evolves. -Raynaud's: no ulcers and reports of color changes to white in the hands & feet w/ the cold. Discussed to keep hands warm and if not controlled can add Ca channel blocker if BP allows -reviewed labs 11/24/23 w/ +IVIS 1:320 speckled; DS-DNA neg; C3 normal; high C4 (not low); urine prot/creat ratio=0.1 -lab as below w/ disease activity markers to monitor intermittently -has +TPO thyroid abs as discussed +IVIS can be seen with cross reactivity  +RNP: likely being seen in the setting of RA -saw PulDr. Jose Luis clemens 6/20/23 with PFTs normal and monitors there -states Echo w/ cardiology, Dr. Hernandez 5/2/23 was ok without pulmonary hypertension w/ +RNP and monitors there  Cervicalgia: intermittent; not much pain today -MRI c-spine 4/6/23 No pannus; spondylosis/DDD -MRI c-spine [facility] 2/28/22 DDD -xray c-spine 12/22/21 DDD, straightening of normal cervical lordosis  LBP: intermittent; not much pain today -xray L-spine 4/6/23 fusion uncomplicated -xray SI 4/6/23 Normal -reviewed MRI L-spine 1/30/13 w/ DDD, disc herniation -states she had spinal fusion surgery done in past -Advil PRN w/ food sparingly if needed  Depression/Anxiety: denies any SI or HI. -on bupropion w/ PCP  -educated on symptoms to monitor for in detail and alert us if any concerns. -knows to stay up to date on health maintenance w/ PCP -f/u in 7-8 weeks w/ labs please or sooner as needed.

## 2024-06-13 NOTE — HISTORY OF PRESENT ILLNESS
[FreeTextEntry1] : 53-year-old female, here for the first follow up reports FH of depression/anxiety, and celiac in her sister; w/ PMH of depression/anxiety in patient, with OA hands w/ +RF=37; high ESR; +IVIS >1:2560 Speckled; +RNP=3.9 (from 3.3); with reports of joint pain in b/l hands/MCPs/PIPs w/ morning stiffness all day, w/ pain in Rt>lt ankles, pain in hips, and sicca symptoms concerning for RA.  Today she states she is taking  mg total that she is tolerating well at this time. States she saw OpthoDr. Worley in 2024 with eye exam normal on HCQ and will monitor there. Denies any swelling or redness or warmth of any joints on med now. Has good ROM in b/l shoulders, no pelvic/girdle stiffness and able to stand up without using her hands, no temporal pain/unremitting headaches, no vision changes, no jaw pain. States she notices intermittent neck pain with rotation without paresthesias at this time; not much neck pain today. States she notices intermittent lower back pain worse with laying; better with stretching; not much pain today. Denies any loss of bowel incontinence or saddle anesthesias. States she has had spinal fusion done in the past. Denies any fever/chills, no rashes, no ulcers, +dry eyes, + dry mouth, + raynaud's symptoms in the hands & feet turning white in the cold, no infectious diarrhea or  symptoms at this time. Denies any SOB or cough at this time. Denies any history of blood clot, no stroke, 1 , no miscarriages.  States her depression is controlled on the antidepressant and denies any suicidal ideation or homicidal ideation at this time.  States she is not postmenopausal yet so we will defer DEXA at this time.

## 2024-06-20 ENCOUNTER — RX RENEWAL (OUTPATIENT)
Age: 54
End: 2024-06-20

## 2024-06-20 RX ORDER — HYDROXYCHLOROQUINE SULFATE 200 MG/1
200 TABLET, FILM COATED ORAL
Qty: 90 | Refills: 0 | Status: ACTIVE | COMMUNITY
Start: 2023-04-10 | End: 1900-01-01

## 2024-06-27 ENCOUNTER — APPOINTMENT (OUTPATIENT)
Dept: HEMATOLOGY ONCOLOGY | Facility: CLINIC | Age: 54
End: 2024-06-27

## 2024-07-18 ENCOUNTER — RX RENEWAL (OUTPATIENT)
Age: 54
End: 2024-07-18

## 2024-08-01 ENCOUNTER — NON-APPOINTMENT (OUTPATIENT)
Age: 54
End: 2024-08-01

## 2024-08-06 ENCOUNTER — NON-APPOINTMENT (OUTPATIENT)
Age: 54
End: 2024-08-06

## 2024-08-07 ENCOUNTER — APPOINTMENT (OUTPATIENT)
Dept: RHEUMATOLOGY | Facility: CLINIC | Age: 54
End: 2024-08-07

## 2024-08-07 PROCEDURE — G2211 COMPLEX E/M VISIT ADD ON: CPT

## 2024-08-07 PROCEDURE — 99214 OFFICE O/P EST MOD 30 MIN: CPT

## 2024-08-07 NOTE — ASSESSMENT
[FreeTextEntry1] : 53-year-old female, here for the first follow up reports FH of depression/anxiety, and celiac in her sister; w/ PMH of depression/anxiety in patient, with OA hands w/ +RF=37; high ESR; +IVIS >1:2560 Speckled; +RNP=3.9 (from 3.3); with reports of joint pain in b/l hands/MCPs/PIPs w/ morning stiffness all day, w/ pain in Rt>lt ankles, pain in hips, and sicca symptoms concerning for RA.  Seropositive RA:  -stable today without synovitis -reviewed labs 8/2/24 w/ high ESR=48 (from 17 from 28); high CRP=32 (from 4 from 8); CBC ok; CMP ok; 3/15/24 hepatitis negative; TPMT enzyme Normal; 11/24/23 w/ high ESR=28 (from 25); high CRP=8 (from 5); +IVIS 1: 320 speckled; DS-DNA neg; C3 normal; high C4 (not low); urine prot/creat ratio=0.1; CBC ok; CMP w/ low Ax=079 (told to monitor w/ PCP please); 6/9/23 +RNP=3.9 (from 3.3); +IVIS 1:2560 speckled; 3/31/23 w/ repeat +RF=36; high ESR=38; Vectra=20 low disease activity; +RNP=3.3; +IVIS 1:1280 Speckled; with other serologies stable at this time; 3/22/23 +RF=37; raised ESR=29; high CRP=5; +IVIS >1:2560 Speckled -discussed r/b/s of refilling Plaquenil 300 mg PO total, closer to wt based, w/ G6PD normal with pt agreeable and prescription sent as below -she saw OpthoDr. Worley 6/2024 with eye exam normal on HCQ and told to monitor there -labs as below requested for f/u  -dry eyes: artificial tears PRN & states not noted w/ Optho; RO/LA negative; Early Sjogren ab Negative; can be secondary symptom of RA; knows gold standard for Sjogren is lip biopsy and defers it -dry mouth: discussed biotene lozenges prescrbied to help -xray b/l hands 4/6/23 Normal -xray b/l feet 4/6/23 w/ Rt hallux valgus deformity with bunion, OA MTP -xray b/l hips 4/6/23 Normal  Raised ESR/CRP:  -she reports perhaps bull eye rash on abdomen and taking doxycycline for 2 weeks at this time for possible tick bite from urgent care; no synovitis today; denies any wt loss and knows to stay up to date on health maintenance w/ PCP please   -will monitor on repeat  +IVIS: +IVIS >1:2560 Speckled -Clinically reports of sicca symptoms and raynaud's that can be seen w/ Speckled pattern without much symptoms of lupus at this time and educated on symptoms to monitor for in detail if she evolves. -Raynaud's: no ulcers and reports of color changes to white in the hands & feet w/ the cold. Discussed to keep hands warm and if not controlled can add Ca channel blocker if BP allows -reviewed labs 11/24/23 w/ +IVIS 1:320 speckled; DS-DNA neg; C3 normal; high C4 (not low); urine prot/creat ratio=0.1 -lab as below w/ disease activity markers to monitor intermittently -has +TPO thyroid abs as discussed +IVIS can be seen with cross reactivity  +RNP: likely being seen in the setting of RA -saw Dr. oJse Luis Harris 6/20/23 with PFTs normal and monitors there -states Echo w/ cardiology, Dr. Hernandez 5/2/23 was ok without pulmonary hypertension w/ +RNP and monitors there  Cervicalgia: intermittent; not much pain today -MRI c-spine 4/6/23 No pannus; spondylosis/DDD -MRI c-spine [facility] 2/28/22 DDD -xray c-spine 12/22/21 DDD, straightening of normal cervical lordosis  LBP: intermittent; not much pain today -xray L-spine 4/6/23 fusion uncomplicated -xray SI 4/6/23 Normal -reviewed MRI L-spine 1/30/13 w/ DDD, disc herniation -states she had spinal fusion surgery done in past -Advil PRN w/ food sparingly if needed  Depression/Anxiety: denies any SI or HI. -on bupropion w/ PCP  -educated on symptoms to monitor for in detail and alert us if any concerns. -knows to stay up to date on health maintenance w/ PCP -f/u in 2 mo w/ labs please or sooner as needed.

## 2024-08-07 NOTE — HISTORY OF PRESENT ILLNESS
[FreeTextEntry1] : 53-year-old female, here for the first follow up reports FH of depression/anxiety, and celiac in her sister; w/ PMH of depression/anxiety in patient, with OA hands w/ +RF=37; high ESR; +IVIS >1:2560 Speckled; +RNP=3.9 (from 3.3); with reports of joint pain in b/l hands/MCPs/PIPs w/ morning stiffness all day, w/ pain in Rt>lt ankles, pain in hips, and sicca symptoms concerning for RA.  Today she states she is taking  mg total that she is tolerating well at this time. States she saw OpthoDr. Worley in 2024 with eye exam normal on HCQ and will monitor there. Denies any swelling or redness or warmth of any joints on med now. She reports perhaps bull eye rash on abdomen and taking doxycycline for 2 weeks at this time for possible tick bite from urgent care. Has good ROM in b/l shoulders, no pelvic/girdle stiffness and able to stand up without using her hands, no temporal pain/unremitting headaches, no vision changes, no jaw pain. States she notices intermittent neck pain with rotation without paresthesias at this time; not much neck pain today. States she notices intermittent lower back pain worse with laying; better with stretching; not much pain today. Denies any loss of bowel incontinence or saddle anesthesias. States she has had spinal fusion done in the past. Denies any fever/chills, no rashes, no ulcers, +dry eyes, + dry mouth, + raynaud's symptoms in the hands & feet turning white in the cold, no infectious diarrhea or  symptoms at this time. Denies any SOB or cough at this time. Denies any history of blood clot, no stroke, 1 , no miscarriages.  States her depression is controlled on the antidepressant and denies any suicidal ideation or homicidal ideation at this time.  States she is not postmenopausal yet so we will defer DEXA at this time.

## 2024-09-05 ENCOUNTER — RX RENEWAL (OUTPATIENT)
Age: 54
End: 2024-09-05

## 2024-10-22 ENCOUNTER — RX RENEWAL (OUTPATIENT)
Age: 54
End: 2024-10-22

## 2024-10-28 ENCOUNTER — RX RENEWAL (OUTPATIENT)
Age: 54
End: 2024-10-28

## 2024-10-30 ENCOUNTER — RX RENEWAL (OUTPATIENT)
Age: 54
End: 2024-10-30

## 2024-10-30 ENCOUNTER — APPOINTMENT (OUTPATIENT)
Dept: RHEUMATOLOGY | Facility: CLINIC | Age: 54
End: 2024-10-30
Payer: COMMERCIAL

## 2024-10-30 VITALS
WEIGHT: 167 LBS | SYSTOLIC BLOOD PRESSURE: 120 MMHG | DIASTOLIC BLOOD PRESSURE: 80 MMHG | HEIGHT: 65 IN | TEMPERATURE: 97.7 F | HEART RATE: 80 BPM | OXYGEN SATURATION: 99 % | BODY MASS INDEX: 27.82 KG/M2

## 2024-10-30 DIAGNOSIS — M35.00 SICCA SYNDROME, UNSPECIFIED: ICD-10-CM

## 2024-10-30 DIAGNOSIS — M19.041 PRIMARY OSTEOARTHRITIS, RIGHT HAND: ICD-10-CM

## 2024-10-30 DIAGNOSIS — R76.8 OTHER SPECIFIED ABNORMAL IMMUNOLOGICAL FINDINGS IN SERUM: ICD-10-CM

## 2024-10-30 DIAGNOSIS — M05.742 RHEUMATOID ARTHRITIS WITH RHEUMATOID FACTOR OF RIGHT HAND W/OUT ORGAN OR SYSTEMS INVOLVEMENT: ICD-10-CM

## 2024-10-30 DIAGNOSIS — M05.741 RHEUMATOID ARTHRITIS WITH RHEUMATOID FACTOR OF RIGHT HAND W/OUT ORGAN OR SYSTEMS INVOLVEMENT: ICD-10-CM

## 2024-10-30 DIAGNOSIS — M19.042 PRIMARY OSTEOARTHRITIS, RIGHT HAND: ICD-10-CM

## 2024-10-30 DIAGNOSIS — I73.00 RAYNAUD'S SYNDROME W/OUT GANGRENE: ICD-10-CM

## 2024-10-30 PROCEDURE — G2211 COMPLEX E/M VISIT ADD ON: CPT

## 2024-10-30 PROCEDURE — 99214 OFFICE O/P EST MOD 30 MIN: CPT

## 2024-10-30 RX ORDER — PHENTERMINE HYDROCHLORIDE 30 MG/1
30 CAPSULE ORAL
Refills: 0 | Status: ACTIVE | COMMUNITY

## 2024-10-30 RX ORDER — DICLOFENAC SODIUM 10 MG/G
1 GEL TOPICAL
Qty: 1 | Refills: 0 | Status: ACTIVE | COMMUNITY
Start: 2024-10-30 | End: 1900-01-01

## 2024-11-15 ENCOUNTER — RX RENEWAL (OUTPATIENT)
Age: 54
End: 2024-11-15

## 2024-12-04 ENCOUNTER — APPOINTMENT (OUTPATIENT)
Dept: DERMATOLOGY | Facility: CLINIC | Age: 54
End: 2024-12-04

## 2024-12-18 ENCOUNTER — LABORATORY RESULT (OUTPATIENT)
Age: 54
End: 2024-12-18

## 2024-12-19 ENCOUNTER — APPOINTMENT (OUTPATIENT)
Dept: RHEUMATOLOGY | Facility: CLINIC | Age: 54
End: 2024-12-19
Payer: COMMERCIAL

## 2024-12-19 VITALS
HEIGHT: 65 IN | SYSTOLIC BLOOD PRESSURE: 110 MMHG | HEART RATE: 87 BPM | OXYGEN SATURATION: 100 % | BODY MASS INDEX: 26.49 KG/M2 | WEIGHT: 159 LBS | DIASTOLIC BLOOD PRESSURE: 72 MMHG | TEMPERATURE: 97.5 F

## 2024-12-19 DIAGNOSIS — F41.9 ANXIETY DISORDER, UNSPECIFIED: ICD-10-CM

## 2024-12-19 DIAGNOSIS — I73.00 RAYNAUD'S SYNDROME W/OUT GANGRENE: ICD-10-CM

## 2024-12-19 DIAGNOSIS — M05.742 RHEUMATOID ARTHRITIS WITH RHEUMATOID FACTOR OF RIGHT HAND W/OUT ORGAN OR SYSTEMS INVOLVEMENT: ICD-10-CM

## 2024-12-19 DIAGNOSIS — M19.042 PRIMARY OSTEOARTHRITIS, RIGHT HAND: ICD-10-CM

## 2024-12-19 DIAGNOSIS — F32.A ANXIETY DISORDER, UNSPECIFIED: ICD-10-CM

## 2024-12-19 DIAGNOSIS — M19.041 PRIMARY OSTEOARTHRITIS, RIGHT HAND: ICD-10-CM

## 2024-12-19 DIAGNOSIS — M35.00 SICCA SYNDROME, UNSPECIFIED: ICD-10-CM

## 2024-12-19 DIAGNOSIS — M05.741 RHEUMATOID ARTHRITIS WITH RHEUMATOID FACTOR OF RIGHT HAND W/OUT ORGAN OR SYSTEMS INVOLVEMENT: ICD-10-CM

## 2024-12-19 DIAGNOSIS — R76.8 OTHER SPECIFIED ABNORMAL IMMUNOLOGICAL FINDINGS IN SERUM: ICD-10-CM

## 2024-12-19 DIAGNOSIS — M79.7 FIBROMYALGIA: ICD-10-CM

## 2024-12-19 PROCEDURE — G2211 COMPLEX E/M VISIT ADD ON: CPT

## 2024-12-19 PROCEDURE — 99214 OFFICE O/P EST MOD 30 MIN: CPT

## 2024-12-19 RX ORDER — GABAPENTIN 100 MG/1
100 CAPSULE ORAL
Qty: 90 | Refills: 1 | Status: ACTIVE | COMMUNITY
Start: 2024-12-19 | End: 1900-01-01

## 2024-12-19 RX ORDER — LECITHIN 1200 MG
CAPSULE ORAL
Refills: 0 | Status: ACTIVE | COMMUNITY

## 2024-12-19 RX ORDER — PHENTERMINE HYDROCHLORIDE 30 MG/1
30 CAPSULE ORAL
Refills: 0 | Status: ACTIVE | COMMUNITY

## 2025-01-14 ENCOUNTER — RX RENEWAL (OUTPATIENT)
Age: 55
End: 2025-01-14

## 2025-01-24 ENCOUNTER — LABORATORY RESULT (OUTPATIENT)
Age: 55
End: 2025-01-24

## 2025-01-30 ENCOUNTER — APPOINTMENT (OUTPATIENT)
Dept: RHEUMATOLOGY | Facility: CLINIC | Age: 55
End: 2025-01-30
Payer: COMMERCIAL

## 2025-01-30 VITALS
HEIGHT: 65 IN | OXYGEN SATURATION: 100 % | DIASTOLIC BLOOD PRESSURE: 76 MMHG | TEMPERATURE: 97.5 F | SYSTOLIC BLOOD PRESSURE: 111 MMHG | HEART RATE: 82 BPM | BODY MASS INDEX: 26.82 KG/M2 | WEIGHT: 161 LBS

## 2025-01-30 DIAGNOSIS — M70.61 TROCHANTERIC BURSITIS, RIGHT HIP: ICD-10-CM

## 2025-01-30 DIAGNOSIS — R76.8 OTHER SPECIFIED ABNORMAL IMMUNOLOGICAL FINDINGS IN SERUM: ICD-10-CM

## 2025-01-30 DIAGNOSIS — R53.83 OTHER FATIGUE: ICD-10-CM

## 2025-01-30 DIAGNOSIS — M19.041 PRIMARY OSTEOARTHRITIS, RIGHT HAND: ICD-10-CM

## 2025-01-30 DIAGNOSIS — M05.742 RHEUMATOID ARTHRITIS WITH RHEUMATOID FACTOR OF RIGHT HAND W/OUT ORGAN OR SYSTEMS INVOLVEMENT: ICD-10-CM

## 2025-01-30 DIAGNOSIS — M05.741 RHEUMATOID ARTHRITIS WITH RHEUMATOID FACTOR OF RIGHT HAND W/OUT ORGAN OR SYSTEMS INVOLVEMENT: ICD-10-CM

## 2025-01-30 DIAGNOSIS — F41.9 ANXIETY DISORDER, UNSPECIFIED: ICD-10-CM

## 2025-01-30 DIAGNOSIS — I73.00 RAYNAUD'S SYNDROME W/OUT GANGRENE: ICD-10-CM

## 2025-01-30 DIAGNOSIS — M19.042 PRIMARY OSTEOARTHRITIS, RIGHT HAND: ICD-10-CM

## 2025-01-30 DIAGNOSIS — M35.00 SICCA SYNDROME, UNSPECIFIED: ICD-10-CM

## 2025-01-30 DIAGNOSIS — M79.7 FIBROMYALGIA: ICD-10-CM

## 2025-01-30 DIAGNOSIS — F32.A ANXIETY DISORDER, UNSPECIFIED: ICD-10-CM

## 2025-01-30 PROCEDURE — 99214 OFFICE O/P EST MOD 30 MIN: CPT | Mod: 25

## 2025-01-30 PROCEDURE — 20610 DRAIN/INJ JOINT/BURSA W/O US: CPT | Mod: RT

## 2025-01-30 RX ORDER — TRIAMCINOLONE ACETONIDE 40 MG/ML
40 SUSPENSION INTRA-ARTERIAL; INTRAMUSCULAR
Refills: 0 | Status: COMPLETED | OUTPATIENT
Start: 2025-01-30

## 2025-01-30 RX ADMIN — TRIAMCINOLONE ACETONIDE 40 MG/ML: 40 INJECTION, SUSPENSION INTRA-ARTICULAR; INTRAMUSCULAR at 00:00

## 2025-02-14 ENCOUNTER — APPOINTMENT (OUTPATIENT)
Dept: DERMATOLOGY | Facility: CLINIC | Age: 55
End: 2025-02-14
Payer: COMMERCIAL

## 2025-02-14 DIAGNOSIS — L81.4 OTHER MELANIN HYPERPIGMENTATION: ICD-10-CM

## 2025-02-14 DIAGNOSIS — L70.0 ACNE VULGARIS: ICD-10-CM

## 2025-02-14 DIAGNOSIS — Z12.83 ENCOUNTER FOR SCREENING FOR MALIGNANT NEOPLASM OF SKIN: ICD-10-CM

## 2025-02-14 DIAGNOSIS — D22.5 MELANOCYTIC NEVI OF TRUNK: ICD-10-CM

## 2025-02-14 PROCEDURE — 99214 OFFICE O/P EST MOD 30 MIN: CPT

## 2025-03-17 ENCOUNTER — ASOB RESULT (OUTPATIENT)
Age: 55
End: 2025-03-17

## 2025-03-17 ENCOUNTER — APPOINTMENT (OUTPATIENT)
Dept: ANTEPARTUM | Facility: CLINIC | Age: 55
End: 2025-03-17
Payer: COMMERCIAL

## 2025-03-17 PROCEDURE — 76830 TRANSVAGINAL US NON-OB: CPT

## 2025-03-17 PROCEDURE — 76856 US EXAM PELVIC COMPLETE: CPT | Mod: 59

## 2025-03-24 ENCOUNTER — RX RENEWAL (OUTPATIENT)
Age: 55
End: 2025-03-24

## 2025-03-27 ENCOUNTER — OUTPATIENT (OUTPATIENT)
Dept: OUTPATIENT SERVICES | Facility: HOSPITAL | Age: 55
LOS: 1 days | End: 2025-03-27
Payer: MEDICAID

## 2025-03-27 ENCOUNTER — APPOINTMENT (OUTPATIENT)
Dept: RADIOLOGY | Facility: CLINIC | Age: 55
End: 2025-03-27
Payer: COMMERCIAL

## 2025-03-27 ENCOUNTER — APPOINTMENT (OUTPATIENT)
Dept: RHEUMATOLOGY | Facility: CLINIC | Age: 55
End: 2025-03-27
Payer: COMMERCIAL

## 2025-03-27 VITALS
BODY MASS INDEX: 26.82 KG/M2 | SYSTOLIC BLOOD PRESSURE: 121 MMHG | DIASTOLIC BLOOD PRESSURE: 84 MMHG | OXYGEN SATURATION: 98 % | HEIGHT: 65 IN | TEMPERATURE: 97.5 F | HEART RATE: 74 BPM | WEIGHT: 161 LBS

## 2025-03-27 DIAGNOSIS — M35.00 SICCA SYNDROME, UNSPECIFIED: ICD-10-CM

## 2025-03-27 DIAGNOSIS — M54.50 LOW BACK PAIN, UNSPECIFIED: ICD-10-CM

## 2025-03-27 DIAGNOSIS — R76.8 OTHER SPECIFIED ABNORMAL IMMUNOLOGICAL FINDINGS IN SERUM: ICD-10-CM

## 2025-03-27 DIAGNOSIS — I73.00 RAYNAUD'S SYNDROME W/OUT GANGRENE: ICD-10-CM

## 2025-03-27 DIAGNOSIS — M05.741 RHEUMATOID ARTHRITIS WITH RHEUMATOID FACTOR OF RIGHT HAND W/OUT ORGAN OR SYSTEMS INVOLVEMENT: ICD-10-CM

## 2025-03-27 DIAGNOSIS — Z78.0 ASYMPTOMATIC MENOPAUSAL STATE: ICD-10-CM

## 2025-03-27 DIAGNOSIS — M25.569 PAIN IN UNSPECIFIED KNEE: ICD-10-CM

## 2025-03-27 DIAGNOSIS — E87.1 HYPO-OSMOLALITY AND HYPONATREMIA: ICD-10-CM

## 2025-03-27 DIAGNOSIS — M05.742 RHEUMATOID ARTHRITIS WITH RHEUMATOID FACTOR OF RIGHT HAND W/OUT ORGAN OR SYSTEMS INVOLVEMENT: ICD-10-CM

## 2025-03-27 DIAGNOSIS — M51.369: ICD-10-CM

## 2025-03-27 DIAGNOSIS — M70.61 TROCHANTERIC BURSITIS, RIGHT HIP: ICD-10-CM

## 2025-03-27 DIAGNOSIS — M25.559 PAIN IN UNSPECIFIED HIP: ICD-10-CM

## 2025-03-27 PROCEDURE — G2211 COMPLEX E/M VISIT ADD ON: CPT | Mod: NC

## 2025-03-27 PROCEDURE — 72100 X-RAY EXAM L-S SPINE 2/3 VWS: CPT | Mod: 26

## 2025-03-27 PROCEDURE — 72100 X-RAY EXAM L-S SPINE 2/3 VWS: CPT

## 2025-03-27 PROCEDURE — 73521 X-RAY EXAM HIPS BI 2 VIEWS: CPT | Mod: 26

## 2025-03-27 PROCEDURE — 73565 X-RAY EXAM OF KNEES: CPT

## 2025-03-27 PROCEDURE — 72200 X-RAY EXAM SI JOINTS: CPT | Mod: 26

## 2025-03-27 PROCEDURE — 73521 X-RAY EXAM HIPS BI 2 VIEWS: CPT

## 2025-03-27 PROCEDURE — 73565 X-RAY EXAM OF KNEES: CPT | Mod: 26

## 2025-03-27 PROCEDURE — 72200 X-RAY EXAM SI JOINTS: CPT

## 2025-03-27 PROCEDURE — 99214 OFFICE O/P EST MOD 30 MIN: CPT

## 2025-03-28 ENCOUNTER — OUTPATIENT (OUTPATIENT)
Dept: OUTPATIENT SERVICES | Facility: HOSPITAL | Age: 55
LOS: 1 days | End: 2025-03-28
Payer: MEDICAID

## 2025-03-28 ENCOUNTER — APPOINTMENT (OUTPATIENT)
Dept: RADIOLOGY | Facility: CLINIC | Age: 55
End: 2025-03-28

## 2025-03-28 DIAGNOSIS — M54.50 LOW BACK PAIN, UNSPECIFIED: ICD-10-CM

## 2025-03-28 DIAGNOSIS — Z00.8 ENCOUNTER FOR OTHER GENERAL EXAMINATION: ICD-10-CM

## 2025-03-28 PROCEDURE — 77085 DXA BONE DENSITY AXL VRT FX: CPT | Mod: 26

## 2025-03-28 PROCEDURE — 77085 DXA BONE DENSITY AXL VRT FX: CPT

## 2025-04-03 ENCOUNTER — APPOINTMENT (OUTPATIENT)
Dept: ORTHOPEDIC SURGERY | Facility: CLINIC | Age: 55
End: 2025-04-03

## 2025-04-08 ENCOUNTER — APPOINTMENT (OUTPATIENT)
Dept: NEUROSURGERY | Facility: CLINIC | Age: 55
End: 2025-04-08

## 2025-04-11 ENCOUNTER — APPOINTMENT (OUTPATIENT)
Dept: PHYSICAL MEDICINE AND REHAB | Facility: CLINIC | Age: 55
End: 2025-04-11

## 2025-05-21 ENCOUNTER — RX RENEWAL (OUTPATIENT)
Age: 55
End: 2025-05-21

## 2025-06-02 ENCOUNTER — RX RENEWAL (OUTPATIENT)
Age: 55
End: 2025-06-02

## 2025-06-02 ENCOUNTER — APPOINTMENT (OUTPATIENT)
Dept: RHEUMATOLOGY | Facility: CLINIC | Age: 55
End: 2025-06-02
Payer: COMMERCIAL

## 2025-06-02 VITALS
SYSTOLIC BLOOD PRESSURE: 106 MMHG | OXYGEN SATURATION: 100 % | BODY MASS INDEX: 26.99 KG/M2 | HEART RATE: 74 BPM | WEIGHT: 162 LBS | TEMPERATURE: 97.5 F | DIASTOLIC BLOOD PRESSURE: 70 MMHG | HEIGHT: 65 IN

## 2025-06-02 DIAGNOSIS — M05.741 RHEUMATOID ARTHRITIS WITH RHEUMATOID FACTOR OF RIGHT HAND W/OUT ORGAN OR SYSTEMS INVOLVEMENT: ICD-10-CM

## 2025-06-02 DIAGNOSIS — M05.742 RHEUMATOID ARTHRITIS WITH RHEUMATOID FACTOR OF RIGHT HAND W/OUT ORGAN OR SYSTEMS INVOLVEMENT: ICD-10-CM

## 2025-06-02 DIAGNOSIS — M47.816 SPONDYLOSIS W/OUT MYELOPATHY OR RADICULOPATHY, LUMBAR REGION: ICD-10-CM

## 2025-06-02 DIAGNOSIS — R76.8 OTHER SPECIFIED ABNORMAL IMMUNOLOGICAL FINDINGS IN SERUM: ICD-10-CM

## 2025-06-02 DIAGNOSIS — M35.00 SICCA SYNDROME, UNSPECIFIED: ICD-10-CM

## 2025-06-02 DIAGNOSIS — M47.814 SPONDYLOSIS W/OUT MYELOPATHY OR RADICULOPATHY, THORACIC REGION: ICD-10-CM

## 2025-06-02 DIAGNOSIS — Z71.2 PERSON CONSULTING FOR EXPLANATION OF EXAMINATION OR TEST FINDINGS: ICD-10-CM

## 2025-06-02 DIAGNOSIS — I73.00 RAYNAUD'S SYNDROME W/OUT GANGRENE: ICD-10-CM

## 2025-06-02 PROCEDURE — 99214 OFFICE O/P EST MOD 30 MIN: CPT

## 2025-06-02 PROCEDURE — G2211 COMPLEX E/M VISIT ADD ON: CPT | Mod: NC

## 2025-06-04 ENCOUNTER — NON-APPOINTMENT (OUTPATIENT)
Age: 55
End: 2025-06-04

## 2025-06-17 ENCOUNTER — APPOINTMENT (OUTPATIENT)
Dept: INTERNAL MEDICINE | Facility: CLINIC | Age: 55
End: 2025-06-17
Payer: COMMERCIAL

## 2025-06-17 VITALS
RESPIRATION RATE: 16 BRPM | HEIGHT: 65 IN | BODY MASS INDEX: 26.16 KG/M2 | OXYGEN SATURATION: 95 % | TEMPERATURE: 97.2 F | HEART RATE: 73 BPM | SYSTOLIC BLOOD PRESSURE: 114 MMHG | DIASTOLIC BLOOD PRESSURE: 70 MMHG | WEIGHT: 157 LBS

## 2025-06-17 PROBLEM — E83.19 IRON EXCESS: Status: RESOLVED | Noted: 2023-06-20 | Resolved: 2025-06-17

## 2025-06-17 PROBLEM — Z87.39 HISTORY OF JOINT PAIN: Status: RESOLVED | Noted: 2023-03-27 | Resolved: 2025-06-17

## 2025-06-17 PROBLEM — Z86.59 HISTORY OF DEPRESSION: Status: RESOLVED | Noted: 2022-03-21 | Resolved: 2025-06-17

## 2025-06-17 PROBLEM — Z87.2 HISTORY OF LENTIGO: Status: RESOLVED | Noted: 2021-10-18 | Resolved: 2025-06-17

## 2025-06-17 PROBLEM — R10.9 ABDOMINAL CRAMPING: Status: RESOLVED | Noted: 2023-05-11 | Resolved: 2025-06-17

## 2025-06-17 PROBLEM — M19.049 HAND ARTHROPATHY: Status: RESOLVED | Noted: 2023-03-27 | Resolved: 2025-06-17

## 2025-06-17 PROBLEM — M25.559 HIP JOINT PAIN: Status: RESOLVED | Noted: 2025-03-27 | Resolved: 2025-06-17

## 2025-06-17 PROBLEM — E87.1 HYPONATREMIA: Status: RESOLVED | Noted: 2025-03-27 | Resolved: 2025-06-17

## 2025-06-17 PROBLEM — L28.0 LICHEN SIMPLEX CHRONICUS: Status: RESOLVED | Noted: 2021-10-18 | Resolved: 2025-06-17

## 2025-06-17 PROBLEM — I77.89 DIFFUSE INTIMAL THICKENING OF ARTERY: Status: RESOLVED | Noted: 2022-05-15 | Resolved: 2025-06-17

## 2025-06-17 PROBLEM — R06.09 DYSPNEA ON EXERTION: Status: RESOLVED | Noted: 2023-06-20 | Resolved: 2025-06-17

## 2025-06-17 PROBLEM — Z86.018 HISTORY OF MELANOCYTIC NEVUS OF TRUNK: Status: RESOLVED | Noted: 2021-10-18 | Resolved: 2025-06-17

## 2025-06-17 PROBLEM — R79.0 ABNORMAL IRON SATURATION: Status: RESOLVED | Noted: 2023-03-24 | Resolved: 2025-06-17

## 2025-06-17 PROBLEM — R79.82 CRP ELEVATED: Status: RESOLVED | Noted: 2023-03-27 | Resolved: 2025-06-17

## 2025-06-17 PROBLEM — Z78.0 POSTMENOPAUSAL: Status: RESOLVED | Noted: 2025-03-27 | Resolved: 2025-06-17

## 2025-06-17 PROBLEM — Z86.018 HISTORY OF DERMATOFIBROMA: Status: RESOLVED | Noted: 2022-11-21 | Resolved: 2025-06-17

## 2025-06-17 PROBLEM — E87.1 HYPONATREMIA: Status: ACTIVE | Noted: 2025-06-17

## 2025-06-17 PROBLEM — Z87.39 HISTORY OF NECK PAIN: Status: RESOLVED | Noted: 2023-03-27 | Resolved: 2025-06-17

## 2025-06-17 PROBLEM — Z87.19 HISTORY OF CHRONIC CONSTIPATION: Status: RESOLVED | Noted: 2023-05-04 | Resolved: 2025-06-17

## 2025-06-17 PROBLEM — R70.0 ELEVATED SED RATE: Status: RESOLVED | Noted: 2023-03-27 | Resolved: 2025-06-17

## 2025-06-17 PROBLEM — Z71.2 VISIT FOR REVIEW OF DEXA SCAN: Status: RESOLVED | Noted: 2025-06-02 | Resolved: 2025-06-17

## 2025-06-17 PROBLEM — M70.62 TROCHANTERIC BURSITIS OF LEFT HIP: Status: RESOLVED | Noted: 2023-04-10 | Resolved: 2025-06-17

## 2025-06-17 PROBLEM — M25.569 INTERMITTENT KNEE PAIN: Status: RESOLVED | Noted: 2025-03-27 | Resolved: 2025-06-17

## 2025-06-17 PROBLEM — Z87.2 HISTORY OF ACNE VULGARIS: Status: RESOLVED | Noted: 2020-10-16 | Resolved: 2025-06-17

## 2025-06-17 PROBLEM — R14.0 ABDOMINAL BLOATING: Status: RESOLVED | Noted: 2023-03-27 | Resolved: 2025-06-17

## 2025-06-17 PROBLEM — M70.61 TROCHANTERIC BURSITIS OF RIGHT HIP: Status: RESOLVED | Noted: 2023-04-10 | Resolved: 2025-06-17

## 2025-06-17 PROCEDURE — 99396 PREV VISIT EST AGE 40-64: CPT

## 2025-06-19 ENCOUNTER — LABORATORY RESULT (OUTPATIENT)
Age: 55
End: 2025-06-19

## 2025-06-20 LAB
25(OH)D3 SERPL-MCNC: 40.3 NG/ML
ALBUMIN SERPL ELPH-MCNC: 4.7 G/DL
ALP BLD-CCNC: 83 U/L
ALT SERPL-CCNC: 23 U/L
ANION GAP SERPL CALC-SCNC: 17 MMOL/L
APPEARANCE: CLEAR
AST SERPL-CCNC: 31 U/L
BACTERIA: NEGATIVE /HPF
BILIRUB SERPL-MCNC: 0.8 MG/DL
BILIRUBIN URINE: NEGATIVE
BLOOD URINE: NEGATIVE
BUN SERPL-MCNC: 11 MG/DL
CALCIUM SERPL-MCNC: 10.2 MG/DL
CAST: 0 /LPF
CHLORIDE ?TM UR-SCNC: 37 MMOL/L
CHLORIDE SERPL-SCNC: 94 MMOL/L
CHOLEST SERPL-MCNC: 272 MG/DL
CO2 SERPL-SCNC: 20 MMOL/L
COLOR: YELLOW
CREAT SERPL-MCNC: 1.03 MG/DL
EGFRCR SERPLBLD CKD-EPI 2021: 64 ML/MIN/1.73M2
EPITHELIAL CELLS: 0 /HPF
ESTIMATED AVERAGE GLUCOSE: 88 MG/DL
FOLATE SERPL-MCNC: 13.8 NG/ML
FT4I SERPL CALC-MCNC: 8.2 INDEX
GLUCOSE QUALITATIVE U: NEGATIVE MG/DL
GLUCOSE SERPL-MCNC: 73 MG/DL
HBA1C MFR BLD HPLC: 4.7 %
HCT VFR BLD CALC: 43.2 %
HDLC SERPL-MCNC: 77 MG/DL
HGB BLD-MCNC: 14.1 G/DL
IRON SATN MFR SERPL: 51 %
IRON SERPL-MCNC: 187 UG/DL
KETONES URINE: NEGATIVE MG/DL
LDLC SERPL-MCNC: 185 MG/DL
LEUKOCYTE ESTERASE URINE: NEGATIVE
MCHC RBC-ENTMCNC: 32.6 G/DL
MCHC RBC-ENTMCNC: 33.2 PG
MCV RBC AUTO: 101.6 FL
MICROSCOPIC-UA: NORMAL
NITRITE URINE: NEGATIVE
NONHDLC SERPL-MCNC: 194 MG/DL
OSMOLALITY SERPL: 273 MOSMOL/KG
OSMOLALITY UR: 275 MOSM/KG
PH URINE: 7.5
PLATELET # BLD AUTO: 339 K/UL
POTASSIUM SERPL-SCNC: 5 MMOL/L
POTASSIUM UR-SCNC: 26.9 MMOL/L
PROT SERPL-MCNC: 7.2 G/DL
PROTEIN URINE: NEGATIVE MG/DL
RBC # BLD: 4.25 M/UL
RBC # FLD: 11.9 %
RED BLOOD CELLS URINE: 0 /HPF
SODIUM ?TM SUB UR QN: 54 MMOL/L
SODIUM SERPL-SCNC: 132 MMOL/L
SPECIFIC GRAVITY URINE: 1.01
T3 SERPL-MCNC: 104 NG/DL
T3FREE SERPL-MCNC: 3.03 PG/ML
T3RU NFR SERPL: 1.1 TBI
T4 FREE SERPL-MCNC: 1.2 NG/DL
T4 SERPL-MCNC: 9.1 UG/DL
TIBC SERPL-MCNC: 368 UG/DL
TRIGL SERPL-MCNC: 60 MG/DL
TSH SERPL-ACNC: 2.01 UIU/ML
UIBC SERPL-MCNC: 181 UG/DL
UROBILINOGEN URINE: 0.2 MG/DL
VIT B12 SERPL-MCNC: 473 PG/ML
WBC # FLD AUTO: 6.08 K/UL
WHITE BLOOD CELLS URINE: 0 /HPF

## 2025-07-07 ENCOUNTER — OUTPATIENT (OUTPATIENT)
Dept: OUTPATIENT SERVICES | Facility: HOSPITAL | Age: 55
LOS: 1 days | End: 2025-07-07
Payer: MEDICAID

## 2025-07-07 ENCOUNTER — APPOINTMENT (OUTPATIENT)
Dept: ULTRASOUND IMAGING | Facility: CLINIC | Age: 55
End: 2025-07-07

## 2025-07-07 DIAGNOSIS — E87.1 HYPO-OSMOLALITY AND HYPONATREMIA: ICD-10-CM

## 2025-07-07 PROCEDURE — 76770 US EXAM ABDO BACK WALL COMP: CPT | Mod: 26

## 2025-07-07 PROCEDURE — 76770 US EXAM ABDO BACK WALL COMP: CPT

## 2025-07-09 NOTE — PHYSICAL EXAM
[FreeTextEntry3] : Skin examination performed of the face, neck, chest, hands, lower legs;\par The patient is well, alert and oriented, pleasant and cooperative.\par Eyelids, conjunctivae, oral mucosa, digits and nails all normal.  \par No cervical adenopathy.\par \par few scattered acne lesions on face;  no cysts; \par \par lichenified plaque;  left upper forehead;\par \par + lentigines and solar damage are present in sun exposed areas; \par Multiple benign nevi were noted.  Patient/Caregiver provided printed discharge information.

## 2025-07-23 ENCOUNTER — APPOINTMENT (OUTPATIENT)
Dept: RHEUMATOLOGY | Facility: CLINIC | Age: 55
End: 2025-07-23
Payer: COMMERCIAL

## 2025-07-23 VITALS
OXYGEN SATURATION: 100 % | BODY MASS INDEX: 25.99 KG/M2 | HEIGHT: 65 IN | HEART RATE: 79 BPM | WEIGHT: 156 LBS | DIASTOLIC BLOOD PRESSURE: 70 MMHG | TEMPERATURE: 97.3 F | SYSTOLIC BLOOD PRESSURE: 104 MMHG

## 2025-07-23 DIAGNOSIS — M35.00 SICCA SYNDROME, UNSPECIFIED: ICD-10-CM

## 2025-07-23 DIAGNOSIS — M05.741 RHEUMATOID ARTHRITIS WITH RHEUMATOID FACTOR OF RIGHT HAND W/OUT ORGAN OR SYSTEMS INVOLVEMENT: ICD-10-CM

## 2025-07-23 DIAGNOSIS — R76.8 OTHER SPECIFIED ABNORMAL IMMUNOLOGICAL FINDINGS IN SERUM: ICD-10-CM

## 2025-07-23 DIAGNOSIS — M05.742 RHEUMATOID ARTHRITIS WITH RHEUMATOID FACTOR OF RIGHT HAND W/OUT ORGAN OR SYSTEMS INVOLVEMENT: ICD-10-CM

## 2025-07-23 DIAGNOSIS — M47.814 SPONDYLOSIS W/OUT MYELOPATHY OR RADICULOPATHY, THORACIC REGION: ICD-10-CM

## 2025-07-23 DIAGNOSIS — I73.00 RAYNAUD'S SYNDROME W/OUT GANGRENE: ICD-10-CM

## 2025-07-23 DIAGNOSIS — M47.816 SPONDYLOSIS W/OUT MYELOPATHY OR RADICULOPATHY, LUMBAR REGION: ICD-10-CM

## 2025-07-23 PROCEDURE — G2211 COMPLEX E/M VISIT ADD ON: CPT | Mod: NC

## 2025-07-23 PROCEDURE — 99214 OFFICE O/P EST MOD 30 MIN: CPT

## 2025-07-29 ENCOUNTER — APPOINTMENT (OUTPATIENT)
Dept: CARDIOLOGY | Facility: CLINIC | Age: 55
End: 2025-07-29
Payer: COMMERCIAL

## 2025-07-29 VITALS
HEART RATE: 87 BPM | BODY MASS INDEX: 25.66 KG/M2 | SYSTOLIC BLOOD PRESSURE: 106 MMHG | HEIGHT: 65 IN | OXYGEN SATURATION: 98 % | DIASTOLIC BLOOD PRESSURE: 68 MMHG | WEIGHT: 154 LBS

## 2025-07-29 PROCEDURE — 99215 OFFICE O/P EST HI 40 MIN: CPT | Mod: 25

## 2025-07-29 PROCEDURE — 93000 ELECTROCARDIOGRAM COMPLETE: CPT

## 2025-08-08 ENCOUNTER — OUTPATIENT (OUTPATIENT)
Dept: OUTPATIENT SERVICES | Facility: HOSPITAL | Age: 55
LOS: 1 days | End: 2025-08-08
Payer: SELF-PAY

## 2025-08-08 ENCOUNTER — APPOINTMENT (OUTPATIENT)
Dept: CT IMAGING | Facility: CLINIC | Age: 55
End: 2025-08-08
Payer: SELF-PAY

## 2025-08-08 DIAGNOSIS — Z00.8 ENCOUNTER FOR OTHER GENERAL EXAMINATION: ICD-10-CM

## 2025-08-08 DIAGNOSIS — E78.00 PURE HYPERCHOLESTEROLEMIA, UNSPECIFIED: ICD-10-CM

## 2025-08-08 PROCEDURE — 75571 CT HRT W/O DYE W/CA TEST: CPT | Mod: 26

## 2025-08-08 PROCEDURE — 75571 CT HRT W/O DYE W/CA TEST: CPT

## 2025-08-19 ENCOUNTER — APPOINTMENT (OUTPATIENT)
Dept: CARDIOLOGY | Facility: CLINIC | Age: 55
End: 2025-08-19
Payer: COMMERCIAL

## 2025-08-19 VITALS
HEART RATE: 74 BPM | WEIGHT: 153 LBS | SYSTOLIC BLOOD PRESSURE: 108 MMHG | DIASTOLIC BLOOD PRESSURE: 74 MMHG | OXYGEN SATURATION: 100 % | BODY MASS INDEX: 25.49 KG/M2 | HEIGHT: 65 IN

## 2025-08-19 PROCEDURE — 93000 ELECTROCARDIOGRAM COMPLETE: CPT

## 2025-08-19 PROCEDURE — 99214 OFFICE O/P EST MOD 30 MIN: CPT | Mod: 25

## 2025-08-19 PROCEDURE — 93306 TTE W/DOPPLER COMPLETE: CPT

## 2025-08-27 ENCOUNTER — TRANSCRIPTION ENCOUNTER (OUTPATIENT)
Age: 55
End: 2025-08-27

## 2025-09-09 ENCOUNTER — APPOINTMENT (OUTPATIENT)
Dept: CARDIOLOGY | Facility: CLINIC | Age: 55
End: 2025-09-09

## 2025-09-11 ENCOUNTER — TRANSCRIPTION ENCOUNTER (OUTPATIENT)
Age: 55
End: 2025-09-11